# Patient Record
Sex: MALE | Race: WHITE | NOT HISPANIC OR LATINO | Employment: OTHER | URBAN - METROPOLITAN AREA
[De-identification: names, ages, dates, MRNs, and addresses within clinical notes are randomized per-mention and may not be internally consistent; named-entity substitution may affect disease eponyms.]

---

## 2023-01-01 ENCOUNTER — ANESTHESIA EVENT (INPATIENT)
Dept: PERIOP | Facility: HOSPITAL | Age: 58
DRG: 710 | End: 2023-01-01
Payer: COMMERCIAL

## 2023-01-01 ENCOUNTER — ANESTHESIA (INPATIENT)
Dept: PERIOP | Facility: HOSPITAL | Age: 58
DRG: 710 | End: 2023-01-01
Payer: COMMERCIAL

## 2023-01-01 ENCOUNTER — HOSPITAL ENCOUNTER (INPATIENT)
Facility: HOSPITAL | Age: 58
LOS: 1 days | DRG: 710 | End: 2023-09-14
Attending: EMERGENCY MEDICINE | Admitting: SURGERY
Payer: COMMERCIAL

## 2023-01-01 ENCOUNTER — APPOINTMENT (INPATIENT)
Dept: RADIOLOGY | Facility: HOSPITAL | Age: 58
DRG: 710 | End: 2023-01-01
Payer: COMMERCIAL

## 2023-01-01 ENCOUNTER — APPOINTMENT (EMERGENCY)
Dept: CT IMAGING | Facility: HOSPITAL | Age: 58
DRG: 710 | End: 2023-01-01
Payer: COMMERCIAL

## 2023-01-01 ENCOUNTER — APPOINTMENT (EMERGENCY)
Dept: RADIOLOGY | Facility: HOSPITAL | Age: 58
DRG: 710 | End: 2023-01-01
Payer: COMMERCIAL

## 2023-01-01 ENCOUNTER — HOSPITAL ENCOUNTER (INPATIENT)
Facility: HOSPITAL | Age: 58
LOS: 3 days | DRG: 710 | End: 2023-09-17
Attending: SURGERY | Admitting: SURGERY
Payer: COMMERCIAL

## 2023-01-01 VITALS
HEART RATE: 99 BPM | SYSTOLIC BLOOD PRESSURE: 92 MMHG | DIASTOLIC BLOOD PRESSURE: 55 MMHG | WEIGHT: 163.14 LBS | RESPIRATION RATE: 20 BRPM | OXYGEN SATURATION: 97 % | TEMPERATURE: 97.2 F

## 2023-01-01 VITALS
BODY MASS INDEX: 25.25 KG/M2 | HEIGHT: 70 IN | OXYGEN SATURATION: 100 % | WEIGHT: 176.37 LBS | HEART RATE: 80 BPM | RESPIRATION RATE: 24 BRPM | SYSTOLIC BLOOD PRESSURE: 83 MMHG | TEMPERATURE: 99.3 F | DIASTOLIC BLOOD PRESSURE: 52 MMHG

## 2023-01-01 DIAGNOSIS — A41.9 SEPSIS (HCC): ICD-10-CM

## 2023-01-01 DIAGNOSIS — E87.20 LACTIC ACIDOSIS: ICD-10-CM

## 2023-01-01 DIAGNOSIS — N17.9 AKI (ACUTE KIDNEY INJURY) (HCC): ICD-10-CM

## 2023-01-01 DIAGNOSIS — Z90.49 S/P PARTIAL RESECTION OF COLON: ICD-10-CM

## 2023-01-01 DIAGNOSIS — K63.1 PERFORATED SIGMOID COLON (HCC): ICD-10-CM

## 2023-01-01 DIAGNOSIS — K57.20 DIVERTICULITIS OF LARGE INTESTINE WITH PERFORATION AND ABSCESS WITHOUT BLEEDING: Primary | ICD-10-CM

## 2023-01-01 DIAGNOSIS — Z90.49 S/P SMALL BOWEL RESECTION: ICD-10-CM

## 2023-01-01 DIAGNOSIS — A41.9 SEPTIC SHOCK (HCC): ICD-10-CM

## 2023-01-01 DIAGNOSIS — D69.6 THROMBOCYTOPENIA (HCC): ICD-10-CM

## 2023-01-01 DIAGNOSIS — F10.90 ALCOHOL USE DISORDER: ICD-10-CM

## 2023-01-01 DIAGNOSIS — E87.6 LOW BLOOD POTASSIUM: ICD-10-CM

## 2023-01-01 DIAGNOSIS — M79.89 NECROTIZING SOFT TISSUE INFECTION: Primary | ICD-10-CM

## 2023-01-01 DIAGNOSIS — R65.21 SEPTIC SHOCK (HCC): ICD-10-CM

## 2023-01-01 DIAGNOSIS — D64.9 ANEMIA: ICD-10-CM

## 2023-01-01 DIAGNOSIS — E87.1 HYPONATREMIA: ICD-10-CM

## 2023-01-01 DIAGNOSIS — J18.9 PNEUMONIA: ICD-10-CM

## 2023-01-01 DIAGNOSIS — R79.0 LOW MAGNESIUM LEVEL: ICD-10-CM

## 2023-01-01 DIAGNOSIS — R19.8 PERFORATED ABDOMINAL VISCUS: ICD-10-CM

## 2023-01-01 LAB
2HR DELTA HS TROPONIN: -2 NG/L
ABO GROUP BLD BPU: NORMAL
ABO GROUP BLD: NORMAL
ALBUMIN SERPL BCP-MCNC: 1.7 G/DL (ref 3.5–5)
ALBUMIN SERPL BCP-MCNC: 1.7 G/DL (ref 3.5–5)
ALBUMIN SERPL BCP-MCNC: 2 G/DL (ref 3.5–5)
ALBUMIN SERPL BCP-MCNC: 2.1 G/DL (ref 3.5–5)
ALBUMIN SERPL BCP-MCNC: 2.1 G/DL (ref 3.5–5)
ALL TARGETS: NOT DETECTED
ALP SERPL-CCNC: 117 U/L (ref 34–104)
ALP SERPL-CCNC: 179 U/L (ref 34–104)
ALP SERPL-CCNC: 68 U/L (ref 34–104)
ALP SERPL-CCNC: 75 U/L (ref 34–104)
ALP SERPL-CCNC: 82 U/L (ref 34–104)
ALP SERPL-CCNC: 82 U/L (ref 34–104)
ALP SERPL-CCNC: 98 U/L (ref 34–104)
ALT SERPL W P-5'-P-CCNC: 14 U/L (ref 7–52)
ALT SERPL W P-5'-P-CCNC: 16 U/L (ref 7–52)
ALT SERPL W P-5'-P-CCNC: 18 U/L (ref 7–52)
ALT SERPL W P-5'-P-CCNC: 21 U/L (ref 7–52)
ALT SERPL W P-5'-P-CCNC: 47 U/L (ref 7–52)
ALT SERPL W P-5'-P-CCNC: 47 U/L (ref 7–52)
ALT SERPL W P-5'-P-CCNC: 61 U/L (ref 7–52)
AMMONIA PLAS-SCNC: 62 UMOL/L (ref 18–72)
ANION GAP SERPL CALCULATED.3IONS-SCNC: 15 MMOL/L
ANION GAP SERPL CALCULATED.3IONS-SCNC: 17 MMOL/L
ANION GAP SERPL CALCULATED.3IONS-SCNC: 17 MMOL/L
ANION GAP SERPL CALCULATED.3IONS-SCNC: 18 MMOL/L
ANION GAP SERPL CALCULATED.3IONS-SCNC: 19 MMOL/L
ANION GAP SERPL CALCULATED.3IONS-SCNC: 19 MMOL/L
ANION GAP SERPL CALCULATED.3IONS-SCNC: 20 MMOL/L
ANION GAP SERPL CALCULATED.3IONS-SCNC: 24 MMOL/L
ANION GAP SERPL CALCULATED.3IONS-SCNC: 25 MMOL/L
APAP SERPL-MCNC: <10 UG/ML (ref 10–20)
APTT PPP: 35 SECONDS (ref 23–37)
ARTERIAL PATENCY WRIST A: YES
AST SERPL W P-5'-P-CCNC: 196 U/L (ref 13–39)
AST SERPL W P-5'-P-CCNC: 196 U/L (ref 13–39)
AST SERPL W P-5'-P-CCNC: 198 U/L (ref 13–39)
AST SERPL W P-5'-P-CCNC: 42 U/L (ref 13–39)
AST SERPL W P-5'-P-CCNC: 43 U/L (ref 13–39)
AST SERPL W P-5'-P-CCNC: 57 U/L (ref 13–39)
AST SERPL W P-5'-P-CCNC: 75 U/L (ref 13–39)
ATRIAL RATE: 100 BPM
ATRIAL RATE: 109 BPM
ATRIAL RATE: 111 BPM
BACTERIA BLD CULT: ABNORMAL
BACTERIA SPEC ANAEROBE CULT: ABNORMAL
BACTERIA SPEC BFLD CULT: ABNORMAL
BASE EX.OXY STD BLDV CALC-SCNC: 70.8 % (ref 60–80)
BASE EXCESS BLDA CALC-SCNC: -10 MMOL/L (ref -2–3)
BASE EXCESS BLDA CALC-SCNC: -11.7 MMOL/L
BASE EXCESS BLDA CALC-SCNC: -11.9 MMOL/L
BASE EXCESS BLDA CALC-SCNC: -13 MMOL/L (ref -2–3)
BASE EXCESS BLDA CALC-SCNC: -18.4 MMOL/L
BASE EXCESS BLDA CALC-SCNC: -5.1 MMOL/L
BASE EXCESS BLDA CALC-SCNC: -5.3 MMOL/L
BASE EXCESS BLDA CALC-SCNC: -5.4 MMOL/L
BASE EXCESS BLDA CALC-SCNC: -6 MMOL/L (ref -2–3)
BASE EXCESS BLDA CALC-SCNC: -6.1 MMOL/L
BASE EXCESS BLDA CALC-SCNC: -6.4 MMOL/L
BASE EXCESS BLDA CALC-SCNC: -7 MMOL/L (ref -2–3)
BASE EXCESS BLDA CALC-SCNC: -7 MMOL/L (ref -2–3)
BASE EXCESS BLDA CALC-SCNC: -7.6 MMOL/L
BASE EXCESS BLDA CALC-SCNC: -8 MMOL/L (ref -2–3)
BASE EXCESS BLDA CALC-SCNC: -8.2 MMOL/L
BASE EXCESS BLDA CALC-SCNC: -8.7 MMOL/L
BASE EXCESS BLDV CALC-SCNC: -1.5 MMOL/L
BASOPHILS # BLD MANUAL: 0 THOUSAND/UL (ref 0–0.1)
BASOPHILS NFR MAR MANUAL: 0 % (ref 0–1)
BETA-HYDROXYBUTYRATE: 0.8 MMOL/L
BILIRUB DIRECT SERPL-MCNC: 1.37 MG/DL (ref 0–0.2)
BILIRUB DIRECT SERPL-MCNC: 1.93 MG/DL (ref 0–0.2)
BILIRUB DIRECT SERPL-MCNC: 4.07 MG/DL (ref 0–0.2)
BILIRUB DIRECT SERPL-MCNC: 4.1 MG/DL (ref 0–0.2)
BILIRUB SERPL-MCNC: 1.97 MG/DL (ref 0.2–1)
BILIRUB SERPL-MCNC: 3.3 MG/DL (ref 0.2–1)
BILIRUB SERPL-MCNC: 4.24 MG/DL (ref 0.2–1)
BILIRUB SERPL-MCNC: 4.29 MG/DL (ref 0.2–1)
BILIRUB SERPL-MCNC: 5.6 MG/DL (ref 0.2–1)
BILIRUB SERPL-MCNC: 6.03 MG/DL (ref 0.2–1)
BILIRUB SERPL-MCNC: 6.03 MG/DL (ref 0.2–1)
BLD GP AB SCN SERPL QL: NEGATIVE
BLD GP AB SCN SERPL QL: NEGATIVE
BODY TEMPERATURE: 100.6 DEGREES FEHRENHEIT
BODY TEMPERATURE: 100.8 DEGREES FEHRENHEIT
BODY TEMPERATURE: 100.9 DEGREES FEHRENHEIT
BODY TEMPERATURE: 98.6 DEGREES FEHRENHEIT
BODY TEMPERATURE: 99.1 DEGREES FEHRENHEIT
BODY TEMPERATURE: 99.3 DEGREES FEHRENHEIT
BPU ID: NORMAL
BUN SERPL-MCNC: 48 MG/DL (ref 5–25)
BUN SERPL-MCNC: 49 MG/DL (ref 5–25)
BUN SERPL-MCNC: 51 MG/DL (ref 5–25)
BUN SERPL-MCNC: 51 MG/DL (ref 5–25)
BUN SERPL-MCNC: 52 MG/DL (ref 5–25)
BUN SERPL-MCNC: 53 MG/DL (ref 5–25)
BUN SERPL-MCNC: 54 MG/DL (ref 5–25)
BUN SERPL-MCNC: 56 MG/DL (ref 5–25)
BUN SERPL-MCNC: 58 MG/DL (ref 5–25)
BUN SERPL-MCNC: 60 MG/DL (ref 5–25)
BUN SERPL-MCNC: 60 MG/DL (ref 5–25)
BUN SERPL-MCNC: 62 MG/DL (ref 5–25)
BUN SERPL-MCNC: 62 MG/DL (ref 5–25)
BUN SERPL-MCNC: 64 MG/DL (ref 5–25)
CA-I BLD-SCNC: 0.8 MMOL/L (ref 1.12–1.32)
CA-I BLD-SCNC: 0.85 MMOL/L (ref 1.12–1.32)
CA-I BLD-SCNC: 0.86 MMOL/L (ref 1.12–1.32)
CA-I BLD-SCNC: 0.86 MMOL/L (ref 1.12–1.32)
CA-I BLD-SCNC: 0.87 MMOL/L (ref 1.12–1.32)
CA-I BLD-SCNC: 0.89 MMOL/L (ref 1.12–1.32)
CA-I BLD-SCNC: 0.91 MMOL/L (ref 1.12–1.32)
CA-I BLD-SCNC: 0.91 MMOL/L (ref 1.12–1.32)
CA-I BLD-SCNC: 0.92 MMOL/L (ref 1.12–1.32)
CA-I BLD-SCNC: 0.92 MMOL/L (ref 1.12–1.32)
CA-I BLD-SCNC: 0.94 MMOL/L (ref 1.12–1.32)
CA-I BLD-SCNC: 0.96 MMOL/L (ref 1.12–1.32)
CA-I BLD-SCNC: 0.97 MMOL/L (ref 1.12–1.32)
CA-I BLD-SCNC: 0.97 MMOL/L (ref 1.12–1.32)
CA-I BLD-SCNC: 1.01 MMOL/L (ref 1.12–1.32)
CA-I BLD-SCNC: 1.01 MMOL/L (ref 1.12–1.32)
CA-I BLD-SCNC: 1.04 MMOL/L (ref 1.12–1.32)
CA-I BLD-SCNC: 1.1 MMOL/L (ref 1.12–1.32)
CALCIUM ALBUM COR SERPL-MCNC: 8.4 MG/DL (ref 8.3–10.1)
CALCIUM ALBUM COR SERPL-MCNC: 9.3 MG/DL (ref 8.3–10.1)
CALCIUM ALBUM COR SERPL-MCNC: 9.7 MG/DL (ref 8.3–10.1)
CALCIUM SERPL-MCNC: 6.5 MG/DL (ref 8.4–10.2)
CALCIUM SERPL-MCNC: 6.7 MG/DL (ref 8.4–10.2)
CALCIUM SERPL-MCNC: 6.7 MG/DL (ref 8.4–10.2)
CALCIUM SERPL-MCNC: 6.8 MG/DL (ref 8.4–10.2)
CALCIUM SERPL-MCNC: 6.9 MG/DL (ref 8.4–10.2)
CALCIUM SERPL-MCNC: 7 MG/DL (ref 8.4–10.2)
CALCIUM SERPL-MCNC: 7.1 MG/DL (ref 8.4–10.2)
CALCIUM SERPL-MCNC: 7.1 MG/DL (ref 8.4–10.2)
CALCIUM SERPL-MCNC: 7.2 MG/DL (ref 8.4–10.2)
CALCIUM SERPL-MCNC: 7.3 MG/DL (ref 8.4–10.2)
CALCIUM SERPL-MCNC: 7.3 MG/DL (ref 8.4–10.2)
CALCIUM SERPL-MCNC: 7.4 MG/DL (ref 8.4–10.2)
CALCIUM SERPL-MCNC: 7.5 MG/DL (ref 8.4–10.2)
CALCIUM SERPL-MCNC: 8.1 MG/DL (ref 8.4–10.2)
CARDIAC TROPONIN I PNL SERPL HS: 15 NG/L
CARDIAC TROPONIN I PNL SERPL HS: 17 NG/L
CFFMA (FUNCTIONAL FIBRINOGEN MAX AMPLITUDE): 16.1 MM (ref 15–32)
CFFMA (FUNCTIONAL FIBRINOGEN MAX AMPLITUDE): 19.5 MM (ref 15–32)
CHLORIDE SERPL-SCNC: 100 MMOL/L (ref 96–108)
CHLORIDE SERPL-SCNC: 101 MMOL/L (ref 96–108)
CHLORIDE SERPL-SCNC: 81 MMOL/L (ref 96–108)
CHLORIDE SERPL-SCNC: 91 MMOL/L (ref 96–108)
CHLORIDE SERPL-SCNC: 95 MMOL/L (ref 96–108)
CHLORIDE SERPL-SCNC: 96 MMOL/L (ref 96–108)
CHLORIDE SERPL-SCNC: 97 MMOL/L (ref 96–108)
CHLORIDE SERPL-SCNC: 99 MMOL/L (ref 96–108)
CHLORIDE SERPL-SCNC: 99 MMOL/L (ref 96–108)
CK SERPL-CCNC: 233 U/L (ref 39–308)
CKLY30: 0 % (ref 0–2.6)
CKLY30: 0 % (ref 0–2.6)
CKR(REACTION TIME): 6.4 MIN (ref 4.6–9.1)
CKR(REACTION TIME): 8.9 MIN (ref 4.6–9.1)
CO2 SERPL-SCNC: 10 MMOL/L (ref 21–32)
CO2 SERPL-SCNC: 16 MMOL/L (ref 21–32)
CO2 SERPL-SCNC: 16 MMOL/L (ref 21–32)
CO2 SERPL-SCNC: 17 MMOL/L (ref 21–32)
CO2 SERPL-SCNC: 18 MMOL/L (ref 21–32)
CO2 SERPL-SCNC: 19 MMOL/L (ref 21–32)
CO2 SERPL-SCNC: 20 MMOL/L (ref 21–32)
CO2 SERPL-SCNC: 21 MMOL/L (ref 21–32)
CREAT SERPL-MCNC: 2.07 MG/DL (ref 0.6–1.3)
CREAT SERPL-MCNC: 2.09 MG/DL (ref 0.6–1.3)
CREAT SERPL-MCNC: 2.11 MG/DL (ref 0.6–1.3)
CREAT SERPL-MCNC: 2.27 MG/DL (ref 0.6–1.3)
CREAT SERPL-MCNC: 2.34 MG/DL (ref 0.6–1.3)
CREAT SERPL-MCNC: 2.42 MG/DL (ref 0.6–1.3)
CREAT SERPL-MCNC: 2.44 MG/DL (ref 0.6–1.3)
CREAT SERPL-MCNC: 2.45 MG/DL (ref 0.6–1.3)
CREAT SERPL-MCNC: 2.46 MG/DL (ref 0.6–1.3)
CREAT SERPL-MCNC: 2.46 MG/DL (ref 0.6–1.3)
CREAT SERPL-MCNC: 2.51 MG/DL (ref 0.6–1.3)
CREAT SERPL-MCNC: 2.54 MG/DL (ref 0.6–1.3)
CREAT SERPL-MCNC: 2.65 MG/DL (ref 0.6–1.3)
CREAT SERPL-MCNC: 3.09 MG/DL (ref 0.6–1.3)
CROSSMATCH: NORMAL
CRTMA(RAPIDTEG MAX AMPLITUDE): 47 MM (ref 52–70)
CRTMA(RAPIDTEG MAX AMPLITUDE): 50.6 MM (ref 52–70)
E COLI DNA BLD POS QL NAA+NON-PROBE: DETECTED
EOSINOPHIL # BLD MANUAL: 0 THOUSAND/UL (ref 0–0.4)
EOSINOPHIL NFR BLD MANUAL: 0 % (ref 0–6)
ERYTHROCYTE [DISTWIDTH] IN BLOOD BY AUTOMATED COUNT: 14.4 % (ref 11.6–15.1)
ERYTHROCYTE [DISTWIDTH] IN BLOOD BY AUTOMATED COUNT: 16.2 % (ref 11.6–15.1)
ERYTHROCYTE [DISTWIDTH] IN BLOOD BY AUTOMATED COUNT: 16.5 % (ref 11.6–15.1)
ERYTHROCYTE [DISTWIDTH] IN BLOOD BY AUTOMATED COUNT: 17.4 % (ref 11.6–15.1)
ERYTHROCYTE [DISTWIDTH] IN BLOOD BY AUTOMATED COUNT: 18.5 % (ref 11.6–15.1)
ERYTHROCYTE [DISTWIDTH] IN BLOOD BY AUTOMATED COUNT: 18.8 % (ref 11.6–15.1)
ERYTHROCYTE [DISTWIDTH] IN BLOOD BY AUTOMATED COUNT: 20.7 % (ref 11.6–15.1)
ERYTHROCYTE [DISTWIDTH] IN BLOOD BY AUTOMATED COUNT: 21.3 % (ref 11.6–15.1)
ERYTHROCYTE [DISTWIDTH] IN BLOOD BY AUTOMATED COUNT: 21.3 % (ref 11.6–15.1)
ERYTHROCYTE [DISTWIDTH] IN BLOOD BY AUTOMATED COUNT: 21.8 % (ref 11.6–15.1)
ERYTHROCYTE [DISTWIDTH] IN BLOOD BY AUTOMATED COUNT: 22.1 % (ref 11.6–15.1)
ETHANOL SERPL-MCNC: <10 MG/DL
FLUAV RNA RESP QL NAA+PROBE: NEGATIVE
FLUBV RNA RESP QL NAA+PROBE: NEGATIVE
GFR SERPL CREATININE-BSD FRML MDRD: 20 ML/MIN/1.73SQ M
GFR SERPL CREATININE-BSD FRML MDRD: 25 ML/MIN/1.73SQ M
GFR SERPL CREATININE-BSD FRML MDRD: 26 ML/MIN/1.73SQ M
GFR SERPL CREATININE-BSD FRML MDRD: 26 ML/MIN/1.73SQ M
GFR SERPL CREATININE-BSD FRML MDRD: 27 ML/MIN/1.73SQ M
GFR SERPL CREATININE-BSD FRML MDRD: 28 ML/MIN/1.73SQ M
GFR SERPL CREATININE-BSD FRML MDRD: 29 ML/MIN/1.73SQ M
GFR SERPL CREATININE-BSD FRML MDRD: 30 ML/MIN/1.73SQ M
GFR SERPL CREATININE-BSD FRML MDRD: 33 ML/MIN/1.73SQ M
GFR SERPL CREATININE-BSD FRML MDRD: 33 ML/MIN/1.73SQ M
GFR SERPL CREATININE-BSD FRML MDRD: 34 ML/MIN/1.73SQ M
GLUCOSE SERPL-MCNC: 100 MG/DL (ref 65–140)
GLUCOSE SERPL-MCNC: 101 MG/DL (ref 65–140)
GLUCOSE SERPL-MCNC: 103 MG/DL (ref 65–140)
GLUCOSE SERPL-MCNC: 104 MG/DL (ref 65–140)
GLUCOSE SERPL-MCNC: 110 MG/DL (ref 65–140)
GLUCOSE SERPL-MCNC: 111 MG/DL (ref 65–140)
GLUCOSE SERPL-MCNC: 114 MG/DL (ref 65–140)
GLUCOSE SERPL-MCNC: 116 MG/DL (ref 65–140)
GLUCOSE SERPL-MCNC: 117 MG/DL (ref 65–140)
GLUCOSE SERPL-MCNC: 125 MG/DL (ref 65–140)
GLUCOSE SERPL-MCNC: 128 MG/DL (ref 65–140)
GLUCOSE SERPL-MCNC: 132 MG/DL (ref 65–140)
GLUCOSE SERPL-MCNC: 133 MG/DL (ref 65–140)
GLUCOSE SERPL-MCNC: 166 MG/DL (ref 65–140)
GLUCOSE SERPL-MCNC: 40 MG/DL (ref 65–140)
GLUCOSE SERPL-MCNC: 49 MG/DL (ref 65–140)
GLUCOSE SERPL-MCNC: 53 MG/DL (ref 65–140)
GLUCOSE SERPL-MCNC: 79 MG/DL (ref 65–140)
GLUCOSE SERPL-MCNC: 89 MG/DL (ref 65–140)
GLUCOSE SERPL-MCNC: 89 MG/DL (ref 65–140)
GLUCOSE SERPL-MCNC: 90 MG/DL (ref 65–140)
GLUCOSE SERPL-MCNC: 96 MG/DL (ref 65–140)
GRAM STN SPEC: ABNORMAL
HCO3 BLDA-SCNC: 15.5 MMOL/L (ref 22–28)
HCO3 BLDA-SCNC: 15.5 MMOL/L (ref 22–28)
HCO3 BLDA-SCNC: 15.8 MMOL/L (ref 22–28)
HCO3 BLDA-SCNC: 16.6 MMOL/L (ref 22–28)
HCO3 BLDA-SCNC: 17.6 MMOL/L (ref 22–28)
HCO3 BLDA-SCNC: 17.9 MMOL/L (ref 22–28)
HCO3 BLDA-SCNC: 18 MMOL/L (ref 22–28)
HCO3 BLDA-SCNC: 18.5 MMOL/L (ref 22–28)
HCO3 BLDA-SCNC: 18.6 MMOL/L (ref 22–28)
HCO3 BLDA-SCNC: 19.2 MMOL/L (ref 22–28)
HCO3 BLDA-SCNC: 19.3 MMOL/L (ref 22–28)
HCO3 BLDA-SCNC: 19.5 MMOL/L (ref 22–28)
HCO3 BLDA-SCNC: 20.1 MMOL/L (ref 22–28)
HCO3 BLDA-SCNC: 20.1 MMOL/L (ref 22–28)
HCO3 BLDA-SCNC: 20.2 MMOL/L (ref 24–30)
HCO3 BLDA-SCNC: 21.3 MMOL/L (ref 24–30)
HCO3 BLDA-SCNC: 9.3 MMOL/L (ref 22–28)
HCO3 BLDV-SCNC: 22.6 MMOL/L (ref 24–30)
HCT VFR BLD AUTO: 20.6 % (ref 36.5–49.3)
HCT VFR BLD AUTO: 20.7 % (ref 36.5–49.3)
HCT VFR BLD AUTO: 20.8 % (ref 36.5–49.3)
HCT VFR BLD AUTO: 21.5 % (ref 36.5–49.3)
HCT VFR BLD AUTO: 23.8 % (ref 36.5–49.3)
HCT VFR BLD AUTO: 23.8 % (ref 36.5–49.3)
HCT VFR BLD AUTO: 24.2 % (ref 36.5–49.3)
HCT VFR BLD AUTO: 24.5 % (ref 36.5–49.3)
HCT VFR BLD AUTO: 24.7 % (ref 36.5–49.3)
HCT VFR BLD AUTO: 28.8 % (ref 36.5–49.3)
HCT VFR BLD AUTO: 29.7 % (ref 36.5–49.3)
HCT VFR BLD AUTO: 30.7 % (ref 36.5–49.3)
HCT VFR BLD CALC: 15 % (ref 36.5–49.3)
HCT VFR BLD CALC: 19 % (ref 36.5–49.3)
HCT VFR BLD CALC: 19 % (ref 36.5–49.3)
HCT VFR BLD CALC: 21 % (ref 36.5–49.3)
HCT VFR BLD CALC: 21 % (ref 36.5–49.3)
HCT VFR BLD CALC: 22 % (ref 36.5–49.3)
HCT VFR BLD CALC: 24 % (ref 36.5–49.3)
HCT VFR BLD CALC: 28 % (ref 36.5–49.3)
HGB BLD-MCNC: 10.4 G/DL (ref 12–17)
HGB BLD-MCNC: 11.4 G/DL (ref 12–17)
HGB BLD-MCNC: 7.1 G/DL (ref 12–17)
HGB BLD-MCNC: 7.1 G/DL (ref 12–17)
HGB BLD-MCNC: 7.2 G/DL (ref 12–17)
HGB BLD-MCNC: 7.3 G/DL (ref 12–17)
HGB BLD-MCNC: 8.4 G/DL (ref 12–17)
HGB BLD-MCNC: 8.7 G/DL (ref 12–17)
HGB BLD-MCNC: 8.9 G/DL (ref 12–17)
HGB BLD-MCNC: 9.6 G/DL (ref 12–17)
HGB BLDA-MCNC: 5.1 G/DL (ref 12–17)
HGB BLDA-MCNC: 6.5 G/DL (ref 12–17)
HGB BLDA-MCNC: 6.5 G/DL (ref 12–17)
HGB BLDA-MCNC: 7.1 G/DL (ref 12–17)
HGB BLDA-MCNC: 7.1 G/DL (ref 12–17)
HGB BLDA-MCNC: 7.5 G/DL (ref 12–17)
HGB BLDA-MCNC: 8.2 G/DL (ref 12–17)
HGB BLDA-MCNC: 9.5 G/DL (ref 12–17)
HOROWITZ INDEX BLDA+IHG-RTO: 50 MM[HG]
HOROWITZ INDEX BLDA+IHG-RTO: 60 MM[HG]
HOROWITZ INDEX BLDA+IHG-RTO: 70 MM[HG]
HYPERCHROMIA BLD QL SMEAR: PRESENT
INR PPP: 1.41 (ref 0.84–1.19)
INR PPP: 1.81 (ref 0.84–1.19)
LACTATE SERPL-SCNC: 1.6 MMOL/L (ref 0.5–2)
LACTATE SERPL-SCNC: 1.8 MMOL/L (ref 0.5–2)
LACTATE SERPL-SCNC: 10.4 MMOL/L (ref 0.5–2)
LACTATE SERPL-SCNC: 11.5 MMOL/L (ref 0.5–2)
LACTATE SERPL-SCNC: 2.1 MMOL/L (ref 0.5–2)
LACTATE SERPL-SCNC: 2.1 MMOL/L (ref 0.5–2)
LACTATE SERPL-SCNC: 2.6 MMOL/L (ref 0.5–2)
LACTATE SERPL-SCNC: 3.2 MMOL/L (ref 0.5–2)
LACTATE SERPL-SCNC: 3.2 MMOL/L (ref 0.5–2)
LACTATE SERPL-SCNC: 3.5 MMOL/L (ref 0.5–2)
LACTATE SERPL-SCNC: 4.1 MMOL/L (ref 0.5–2)
LACTATE SERPL-SCNC: 4.3 MMOL/L (ref 0.5–2)
LACTATE SERPL-SCNC: 5.1 MMOL/L (ref 0.5–2)
LACTATE SERPL-SCNC: 5.4 MMOL/L (ref 0.5–2)
LACTATE SERPL-SCNC: 5.6 MMOL/L (ref 0.5–2)
LACTATE SERPL-SCNC: 6.3 MMOL/L (ref 0.5–2)
LACTATE SERPL-SCNC: 7.6 MMOL/L (ref 0.5–2)
LACTATE SERPL-SCNC: 8.7 MMOL/L (ref 0.5–2)
LG PLATELETS BLD QL SMEAR: PRESENT
LIPASE SERPL-CCNC: <6 U/L (ref 11–82)
LYMPHOCYTES # BLD AUTO: 0.91 THOUSAND/UL (ref 0.6–4.47)
LYMPHOCYTES # BLD AUTO: 7 % (ref 14–44)
MAGNESIUM SERPL-MCNC: 1.7 MG/DL (ref 1.9–2.7)
MAGNESIUM SERPL-MCNC: 2.1 MG/DL (ref 1.9–2.7)
MAGNESIUM SERPL-MCNC: 2.2 MG/DL (ref 1.9–2.7)
MAGNESIUM SERPL-MCNC: 2.2 MG/DL (ref 1.9–2.7)
MAGNESIUM SERPL-MCNC: 2.3 MG/DL (ref 1.9–2.7)
MAGNESIUM SERPL-MCNC: 2.4 MG/DL (ref 1.9–2.7)
MAGNESIUM SERPL-MCNC: 2.5 MG/DL (ref 1.9–2.7)
MCH RBC QN AUTO: 31.7 PG (ref 26.8–34.3)
MCH RBC QN AUTO: 31.8 PG (ref 26.8–34.3)
MCH RBC QN AUTO: 32 PG (ref 26.8–34.3)
MCH RBC QN AUTO: 32 PG (ref 26.8–34.3)
MCH RBC QN AUTO: 32.4 PG (ref 26.8–34.3)
MCH RBC QN AUTO: 32.7 PG (ref 26.8–34.3)
MCH RBC QN AUTO: 36.6 PG (ref 26.8–34.3)
MCH RBC QN AUTO: 36.7 PG (ref 26.8–34.3)
MCH RBC QN AUTO: 37 PG (ref 26.8–34.3)
MCH RBC QN AUTO: 38.5 PG (ref 26.8–34.3)
MCH RBC QN AUTO: 40.2 PG (ref 26.8–34.3)
MCHC RBC AUTO-ENTMCNC: 33.3 G/DL (ref 31.4–37.4)
MCHC RBC AUTO-ENTMCNC: 34 G/DL (ref 31.4–37.4)
MCHC RBC AUTO-ENTMCNC: 34.5 G/DL (ref 31.4–37.4)
MCHC RBC AUTO-ENTMCNC: 34.6 G/DL (ref 31.4–37.4)
MCHC RBC AUTO-ENTMCNC: 34.7 G/DL (ref 31.4–37.4)
MCHC RBC AUTO-ENTMCNC: 35 G/DL (ref 31.4–37.4)
MCHC RBC AUTO-ENTMCNC: 35.3 G/DL (ref 31.4–37.4)
MCHC RBC AUTO-ENTMCNC: 35.3 G/DL (ref 31.4–37.4)
MCHC RBC AUTO-ENTMCNC: 35.5 G/DL (ref 31.4–37.4)
MCHC RBC AUTO-ENTMCNC: 36 G/DL (ref 31.4–37.4)
MCHC RBC AUTO-ENTMCNC: 37.1 G/DL (ref 31.4–37.4)
MCV RBC AUTO: 103 FL (ref 82–98)
MCV RBC AUTO: 104 FL (ref 82–98)
MCV RBC AUTO: 106 FL (ref 82–98)
MCV RBC AUTO: 107 FL (ref 82–98)
MCV RBC AUTO: 114 FL (ref 82–98)
MCV RBC AUTO: 90 FL (ref 82–98)
MCV RBC AUTO: 90 FL (ref 82–98)
MCV RBC AUTO: 92 FL (ref 82–98)
MCV RBC AUTO: 93 FL (ref 82–98)
MCV RBC AUTO: 94 FL (ref 82–98)
MCV RBC AUTO: 95 FL (ref 82–98)
MONOCYTES # BLD AUTO: 0.65 THOUSAND/UL (ref 0–1.22)
MONOCYTES NFR BLD: 5 % (ref 4–12)
NEUTROPHILS # BLD MANUAL: 11.44 THOUSAND/UL (ref 1.85–7.62)
NEUTS BAND NFR BLD MANUAL: 10 % (ref 0–8)
NEUTS SEG NFR BLD AUTO: 78 % (ref 43–75)
NRBC BLD AUTO-RTO: 0 /100 WBCS
O2 CT BLDA-SCNC: 10.2 ML/DL (ref 16–23)
O2 CT BLDA-SCNC: 10.8 ML/DL (ref 16–23)
O2 CT BLDA-SCNC: 11 ML/DL (ref 16–23)
O2 CT BLDA-SCNC: 12.2 ML/DL (ref 16–23)
O2 CT BLDA-SCNC: 12.7 ML/DL (ref 16–23)
O2 CT BLDA-SCNC: 12.7 ML/DL (ref 16–23)
O2 CT BLDA-SCNC: 12.9 ML/DL (ref 16–23)
O2 CT BLDA-SCNC: 13 ML/DL (ref 16–23)
O2 CT BLDA-SCNC: 14.6 ML/DL (ref 16–23)
O2 CT BLDA-SCNC: 14.9 ML/DL (ref 16–23)
O2 CT BLDA-SCNC: 15.2 ML/DL (ref 16–23)
O2 CT BLDV-SCNC: 12.3 ML/DL
OXYHGB MFR BLDA: 93.3 % (ref 94–97)
OXYHGB MFR BLDA: 93.4 % (ref 94–97)
OXYHGB MFR BLDA: 95.6 % (ref 94–97)
OXYHGB MFR BLDA: 96.2 % (ref 94–97)
OXYHGB MFR BLDA: 96.7 % (ref 94–97)
OXYHGB MFR BLDA: 96.8 % (ref 94–97)
OXYHGB MFR BLDA: 97 % (ref 94–97)
OXYHGB MFR BLDA: 97.4 % (ref 94–97)
P AXIS: 51 DEGREES
P AXIS: 63 DEGREES
P AXIS: 76 DEGREES
PCO2 BLD: 17 MMOL/L (ref 21–32)
PCO2 BLD: 19 MMOL/L (ref 21–32)
PCO2 BLD: 21 MMOL/L (ref 21–32)
PCO2 BLD: 22 MMOL/L (ref 21–32)
PCO2 BLD: 23 MMOL/L (ref 21–32)
PCO2 BLD: 41.7 MM HG (ref 36–44)
PCO2 BLD: 42.8 MM HG (ref 42–50)
PCO2 BLD: 42.9 MM HG (ref 36–44)
PCO2 BLD: 46.6 MM HG (ref 36–44)
PCO2 BLD: 46.8 MM HG (ref 36–44)
PCO2 BLD: 46.8 MM HG (ref 36–44)
PCO2 BLD: 48.7 MM HG (ref 42–50)
PCO2 BLD: 49.8 MM HG (ref 36–44)
PCO2 BLDA: 28.8 MM HG (ref 36–44)
PCO2 BLDA: 33.3 MM HG (ref 36–44)
PCO2 BLDA: 33.5 MM HG (ref 36–44)
PCO2 BLDA: 33.8 MM HG (ref 36–44)
PCO2 BLDA: 34.1 MM HG (ref 36–44)
PCO2 BLDA: 36.3 MM HG (ref 36–44)
PCO2 BLDA: 39.4 MM HG (ref 36–44)
PCO2 BLDA: 40.5 MM HG (ref 36–44)
PCO2 BLDA: 42 MM HG (ref 36–44)
PCO2 BLDV: 35.9 MM HG (ref 42–50)
PCO2 TEMP ADJ BLDA: 30.2 MM HG (ref 36–44)
PCO2 TEMP ADJ BLDA: 35.6 MM HG (ref 36–44)
PCO2 TEMP ADJ BLDA: 41.2 MM HG (ref 36–44)
PEEP RESPIRATORY: 6 CM[H2O]
PH BLD: 7.11 [PH] (ref 7.35–7.45)
PH BLD: 7.12 [PH] (ref 7.35–7.45)
PH BLD: 7.18 [PH] (ref 7.35–7.45)
PH BLD: 7.2 [PH] (ref 7.35–7.45)
PH BLD: 7.22 [PH] (ref 7.35–7.45)
PH BLD: 7.24 [PH] (ref 7.35–7.45)
PH BLD: 7.25 [PH] (ref 7.3–7.4)
PH BLD: 7.26 [PH] (ref 7.35–7.45)
PH BLD: 7.28 [PH] (ref 7.3–7.4)
PH BLD: 7.29 [PH] (ref 7.35–7.45)
PH BLD: 7.36 [PH] (ref 7.35–7.45)
PH BLDA: 7.13 [PH] (ref 7.35–7.45)
PH BLDA: 7.18 [PH] (ref 7.35–7.45)
PH BLDA: 7.2 [PH] (ref 7.35–7.45)
PH BLDA: 7.28 [PH] (ref 7.35–7.45)
PH BLDA: 7.31 [PH] (ref 7.35–7.45)
PH BLDA: 7.31 [PH] (ref 7.35–7.45)
PH BLDA: 7.35 [PH] (ref 7.35–7.45)
PH BLDA: 7.36 [PH] (ref 7.35–7.45)
PH BLDA: 7.37 [PH] (ref 7.35–7.45)
PH BLDA: 7.38 [PH] (ref 7.35–7.45)
PH BLDA: 7.38 [PH] (ref 7.35–7.45)
PH BLDV: 7.42 [PH] (ref 7.3–7.4)
PHOSPHATE SERPL-MCNC: 5.2 MG/DL (ref 2.7–4.5)
PHOSPHATE SERPL-MCNC: 5.5 MG/DL (ref 2.7–4.5)
PHOSPHATE SERPL-MCNC: 5.5 MG/DL (ref 2.7–4.5)
PHOSPHATE SERPL-MCNC: 6.1 MG/DL (ref 2.7–4.5)
PHOSPHATE SERPL-MCNC: 6.6 MG/DL (ref 2.7–4.5)
PHOSPHATE SERPL-MCNC: 7.3 MG/DL (ref 2.7–4.5)
PLATELET # BLD AUTO: 26 THOUSANDS/UL (ref 149–390)
PLATELET # BLD AUTO: 34 THOUSANDS/UL (ref 149–390)
PLATELET # BLD AUTO: 39 THOUSANDS/UL (ref 149–390)
PLATELET # BLD AUTO: 48 THOUSANDS/UL (ref 149–390)
PLATELET # BLD AUTO: 48 THOUSANDS/UL (ref 149–390)
PLATELET # BLD AUTO: 50 THOUSANDS/UL (ref 149–390)
PLATELET # BLD AUTO: 52 THOUSANDS/UL (ref 149–390)
PLATELET # BLD AUTO: 58 THOUSANDS/UL (ref 149–390)
PLATELET # BLD AUTO: 61 THOUSANDS/UL (ref 149–390)
PLATELET # BLD AUTO: 64 THOUSANDS/UL (ref 149–390)
PLATELET # BLD AUTO: 82 THOUSANDS/UL (ref 149–390)
PLATELET BLD QL SMEAR: ABNORMAL
PMV BLD AUTO: 10.2 FL (ref 8.9–12.7)
PMV BLD AUTO: 10.7 FL (ref 8.9–12.7)
PMV BLD AUTO: 10.7 FL (ref 8.9–12.7)
PMV BLD AUTO: 11.4 FL (ref 8.9–12.7)
PMV BLD AUTO: 11.7 FL (ref 8.9–12.7)
PMV BLD AUTO: 11.8 FL (ref 8.9–12.7)
PMV BLD AUTO: 12 FL (ref 8.9–12.7)
PMV BLD AUTO: 12.4 FL (ref 8.9–12.7)
PMV BLD AUTO: 13 FL (ref 8.9–12.7)
PMV BLD AUTO: 13.1 FL (ref 8.9–12.7)
PMV BLD AUTO: 13.6 FL (ref 8.9–12.7)
PO2 BLD: 126.1 MM HG (ref 75–129)
PO2 BLD: 139 MM HG (ref 75–129)
PO2 BLD: 145 MM HG (ref 35–45)
PO2 BLD: 149 MM HG (ref 35–45)
PO2 BLD: 158.7 MM HG (ref 75–129)
PO2 BLD: 179.2 MM HG (ref 75–129)
PO2 BLD: 384 MM HG (ref 75–129)
PO2 BLD: 386 MM HG (ref 75–129)
PO2 BLD: 71 MM HG (ref 75–129)
PO2 BLD: 98 MM HG (ref 75–129)
PO2 BLD: >400 MM HG (ref 75–129)
PO2 BLDA: 100.1 MM HG (ref 75–129)
PO2 BLDA: 102.6 MM HG (ref 75–129)
PO2 BLDA: 118.4 MM HG (ref 75–129)
PO2 BLDA: 119.1 MM HG (ref 75–129)
PO2 BLDA: 126.9 MM HG (ref 75–129)
PO2 BLDA: 127.3 MM HG (ref 75–129)
PO2 BLDA: 146.1 MM HG (ref 75–129)
PO2 BLDA: 156.3 MM HG (ref 75–129)
PO2 BLDA: 173 MM HG (ref 75–129)
PO2 BLDA: 77.7 MM HG (ref 75–129)
PO2 BLDA: 93.8 MM HG (ref 75–129)
PO2 BLDV: 39.6 MM HG (ref 35–45)
POTASSIUM BLD-SCNC: 2.5 MMOL/L (ref 3.5–5.3)
POTASSIUM BLD-SCNC: 3.1 MMOL/L (ref 3.5–5.3)
POTASSIUM BLD-SCNC: 3.5 MMOL/L (ref 3.5–5.3)
POTASSIUM BLD-SCNC: 3.8 MMOL/L (ref 3.5–5.3)
POTASSIUM BLD-SCNC: 3.9 MMOL/L (ref 3.5–5.3)
POTASSIUM BLD-SCNC: 4 MMOL/L (ref 3.5–5.3)
POTASSIUM BLD-SCNC: 4 MMOL/L (ref 3.5–5.3)
POTASSIUM BLD-SCNC: 4.3 MMOL/L (ref 3.5–5.3)
POTASSIUM SERPL-SCNC: 2.2 MMOL/L (ref 3.5–5.3)
POTASSIUM SERPL-SCNC: 3.3 MMOL/L (ref 3.5–5.3)
POTASSIUM SERPL-SCNC: 3.3 MMOL/L (ref 3.5–5.3)
POTASSIUM SERPL-SCNC: 3.9 MMOL/L (ref 3.5–5.3)
POTASSIUM SERPL-SCNC: 4 MMOL/L (ref 3.5–5.3)
POTASSIUM SERPL-SCNC: 4.1 MMOL/L (ref 3.5–5.3)
POTASSIUM SERPL-SCNC: 4.2 MMOL/L (ref 3.5–5.3)
POTASSIUM SERPL-SCNC: 4.2 MMOL/L (ref 3.5–5.3)
POTASSIUM SERPL-SCNC: 4.3 MMOL/L (ref 3.5–5.3)
POTASSIUM SERPL-SCNC: 4.5 MMOL/L (ref 3.5–5.3)
PR INTERVAL: 158 MS
PR INTERVAL: 158 MS
PR INTERVAL: 160 MS
PROCALCITONIN SERPL-MCNC: 24.18 NG/ML
PROT SERPL-MCNC: 3.2 G/DL (ref 6.4–8.4)
PROT SERPL-MCNC: 3.2 G/DL (ref 6.4–8.4)
PROT SERPL-MCNC: 3.4 G/DL (ref 6.4–8.4)
PROT SERPL-MCNC: 3.5 G/DL (ref 6.4–8.4)
PROT SERPL-MCNC: 4.6 G/DL (ref 6.4–8.4)
PROTHROMBIN TIME: 17.1 SECONDS (ref 11.6–14.5)
PROTHROMBIN TIME: 21.2 SECONDS (ref 11.6–14.5)
QRS AXIS: -31 DEGREES
QRS AXIS: -41 DEGREES
QRS AXIS: -49 DEGREES
QRSD INTERVAL: 92 MS
QRSD INTERVAL: 92 MS
QRSD INTERVAL: 96 MS
QT INTERVAL: 321 MS
QT INTERVAL: 350 MS
QT INTERVAL: 382 MS
QTC INTERVAL: 437 MS
QTC INTERVAL: 472 MS
QTC INTERVAL: 492 MS
RBC # BLD AUTO: 1.92 MILLION/UL (ref 3.88–5.62)
RBC # BLD AUTO: 2.09 MILLION/UL (ref 3.88–5.62)
RBC # BLD AUTO: 2.25 MILLION/UL (ref 3.88–5.62)
RBC # BLD AUTO: 2.28 MILLION/UL (ref 3.88–5.62)
RBC # BLD AUTO: 2.29 MILLION/UL (ref 3.88–5.62)
RBC # BLD AUTO: 2.38 MILLION/UL (ref 3.88–5.62)
RBC # BLD AUTO: 2.64 MILLION/UL (ref 3.88–5.62)
RBC # BLD AUTO: 2.75 MILLION/UL (ref 3.88–5.62)
RBC # BLD AUTO: 2.96 MILLION/UL (ref 3.88–5.62)
RBC # BLD AUTO: 3.03 MILLION/UL (ref 3.88–5.62)
RBC # BLD AUTO: 3.18 MILLION/UL (ref 3.88–5.62)
RBC MORPH BLD: PRESENT
RH BLD: NEGATIVE
RSV RNA RESP QL NAA+PROBE: NEGATIVE
SALICYLATES SERPL-MCNC: <5 MG/DL (ref 3–20)
SAO2 % BLD FROM PO2: 100 % (ref 60–85)
SAO2 % BLD FROM PO2: 100 % (ref 60–85)
SAO2 % BLD FROM PO2: 91 % (ref 60–85)
SAO2 % BLD FROM PO2: 97 % (ref 60–85)
SAO2 % BLD FROM PO2: 98 % (ref 60–85)
SAO2 % BLD FROM PO2: 99 % (ref 60–85)
SAO2 % BLD FROM PO2: 99 % (ref 60–85)
SARS-COV-2 RNA RESP QL NAA+PROBE: NEGATIVE
SIMV VENT INSPIRED AIR FIO2: 50
SIMV VENT PEEP: 6
SIMV VENT TIDAL VOLUME: 500
SIMV VENT: ABNORMAL
SODIUM BLD-SCNC: 126 MMOL/L (ref 136–145)
SODIUM BLD-SCNC: 126 MMOL/L (ref 136–145)
SODIUM BLD-SCNC: 127 MMOL/L (ref 136–145)
SODIUM BLD-SCNC: 132 MMOL/L (ref 136–145)
SODIUM BLD-SCNC: 134 MMOL/L (ref 136–145)
SODIUM BLD-SCNC: 134 MMOL/L (ref 136–145)
SODIUM BLD-SCNC: 135 MMOL/L (ref 136–145)
SODIUM BLD-SCNC: 135 MMOL/L (ref 136–145)
SODIUM SERPL-SCNC: 127 MMOL/L (ref 135–147)
SODIUM SERPL-SCNC: 129 MMOL/L (ref 135–147)
SODIUM SERPL-SCNC: 130 MMOL/L (ref 135–147)
SODIUM SERPL-SCNC: 130 MMOL/L (ref 135–147)
SODIUM SERPL-SCNC: 131 MMOL/L (ref 135–147)
SODIUM SERPL-SCNC: 135 MMOL/L (ref 135–147)
SODIUM SERPL-SCNC: 136 MMOL/L (ref 135–147)
SODIUM SERPL-SCNC: 137 MMOL/L (ref 135–147)
SODIUM SERPL-SCNC: 137 MMOL/L (ref 135–147)
SPECIMEN EXPIRATION DATE: NORMAL
SPECIMEN EXPIRATION DATE: NORMAL
SPECIMEN SOURCE: ABNORMAL
T WAVE AXIS: 81 DEGREES
T WAVE AXIS: 82 DEGREES
T WAVE AXIS: 83 DEGREES
TSH SERPL DL<=0.05 MIU/L-ACNC: 3.62 UIU/ML (ref 0.45–4.5)
UNIT DISPENSE STATUS: NORMAL
UNIT PRODUCT CODE: NORMAL
UNIT PRODUCT VOLUME: 241 ML
UNIT PRODUCT VOLUME: 241 ML
UNIT PRODUCT VOLUME: 256 ML
UNIT PRODUCT VOLUME: 280 ML
UNIT PRODUCT VOLUME: 280 ML
UNIT PRODUCT VOLUME: 300 ML
UNIT PRODUCT VOLUME: 350 ML
UNIT RH: NORMAL
VANCOMYCIN SERPL-MCNC: 17.2 UG/ML (ref 10–20)
VANCOMYCIN SERPL-MCNC: 8.5 UG/ML (ref 10–20)
VENT AC: 16
VENT AC: 25
VENT SIMV: 16
VENT- AC: AC
VENTRICULAR RATE: 100 BPM
VENTRICULAR RATE: 109 BPM
VENTRICULAR RATE: 111 BPM
VT SETTING VENT: 420 ML
VT SETTING VENT: 500 ML
WBC # BLD AUTO: 11.37 THOUSAND/UL (ref 4.31–10.16)
WBC # BLD AUTO: 13 THOUSAND/UL (ref 4.31–10.16)
WBC # BLD AUTO: 7 THOUSAND/UL (ref 4.31–10.16)
WBC # BLD AUTO: 7.53 THOUSAND/UL (ref 4.31–10.16)
WBC # BLD AUTO: 7.87 THOUSAND/UL (ref 4.31–10.16)
WBC # BLD AUTO: 7.95 THOUSAND/UL (ref 4.31–10.16)
WBC # BLD AUTO: 8.24 THOUSAND/UL (ref 4.31–10.16)
WBC # BLD AUTO: 8.31 THOUSAND/UL (ref 4.31–10.16)
WBC # BLD AUTO: 8.73 THOUSAND/UL (ref 4.31–10.16)
WBC # BLD AUTO: 8.78 THOUSAND/UL (ref 4.31–10.16)
WBC # BLD AUTO: 9.94 THOUSAND/UL (ref 4.31–10.16)

## 2023-01-01 PROCEDURE — 97605 NEG PRS WND THER DME<=50SQCM: CPT | Performed by: SURGERY

## 2023-01-01 PROCEDURE — C9113 INJ PANTOPRAZOLE SODIUM, VIA: HCPCS | Performed by: PHYSICIAN ASSISTANT

## 2023-01-01 PROCEDURE — 94760 N-INVAS EAR/PLS OXIMETRY 1: CPT

## 2023-01-01 PROCEDURE — 93005 ELECTROCARDIOGRAM TRACING: CPT

## 2023-01-01 PROCEDURE — 80048 BASIC METABOLIC PNL TOTAL CA: CPT | Performed by: EMERGENCY MEDICINE

## 2023-01-01 PROCEDURE — 82805 BLOOD GASES W/O2 SATURATION: CPT | Performed by: EMERGENCY MEDICINE

## 2023-01-01 PROCEDURE — 0W9G0ZZ DRAINAGE OF PERITONEAL CAVITY, OPEN APPROACH: ICD-10-PCS | Performed by: SURGERY

## 2023-01-01 PROCEDURE — P9035 PLATELET PHERES LEUKOREDUCED: HCPCS

## 2023-01-01 PROCEDURE — 85027 COMPLETE CBC AUTOMATED: CPT | Performed by: EMERGENCY MEDICINE

## 2023-01-01 PROCEDURE — 80202 ASSAY OF VANCOMYCIN: CPT | Performed by: PHYSICIAN ASSISTANT

## 2023-01-01 PROCEDURE — 94003 VENT MGMT INPAT SUBQ DAY: CPT

## 2023-01-01 PROCEDURE — 83735 ASSAY OF MAGNESIUM: CPT | Performed by: SURGERY

## 2023-01-01 PROCEDURE — 85730 THROMBOPLASTIN TIME PARTIAL: CPT | Performed by: EMERGENCY MEDICINE

## 2023-01-01 PROCEDURE — 82330 ASSAY OF CALCIUM: CPT

## 2023-01-01 PROCEDURE — 84145 PROCALCITONIN (PCT): CPT | Performed by: EMERGENCY MEDICINE

## 2023-01-01 PROCEDURE — P9016 RBC LEUKOCYTES REDUCED: HCPCS

## 2023-01-01 PROCEDURE — 82330 ASSAY OF CALCIUM: CPT | Performed by: PHYSICIAN ASSISTANT

## 2023-01-01 PROCEDURE — 82805 BLOOD GASES W/O2 SATURATION: CPT | Performed by: PHYSICIAN ASSISTANT

## 2023-01-01 PROCEDURE — 85347 COAGULATION TIME ACTIVATED: CPT | Performed by: PHYSICIAN ASSISTANT

## 2023-01-01 PROCEDURE — 82803 BLOOD GASES ANY COMBINATION: CPT

## 2023-01-01 PROCEDURE — P9037 PLATE PHERES LEUKOREDU IRRAD: HCPCS

## 2023-01-01 PROCEDURE — 82947 ASSAY GLUCOSE BLOOD QUANT: CPT

## 2023-01-01 PROCEDURE — 84100 ASSAY OF PHOSPHORUS: CPT | Performed by: PHYSICIAN ASSISTANT

## 2023-01-01 PROCEDURE — 85027 COMPLETE CBC AUTOMATED: CPT

## 2023-01-01 PROCEDURE — 0241U HB NFCT DS VIR RESP RNA 4 TRGT: CPT | Performed by: EMERGENCY MEDICINE

## 2023-01-01 PROCEDURE — 99291 CRITICAL CARE FIRST HOUR: CPT | Performed by: PHYSICIAN ASSISTANT

## 2023-01-01 PROCEDURE — 80048 BASIC METABOLIC PNL TOTAL CA: CPT | Performed by: PHYSICIAN ASSISTANT

## 2023-01-01 PROCEDURE — 87040 BLOOD CULTURE FOR BACTERIA: CPT | Performed by: EMERGENCY MEDICINE

## 2023-01-01 PROCEDURE — 0DTU0ZZ RESECTION OF OMENTUM, OPEN APPROACH: ICD-10-PCS | Performed by: SURGERY

## 2023-01-01 PROCEDURE — 30233L1 TRANSFUSION OF NONAUTOLOGOUS FRESH PLASMA INTO PERIPHERAL VEIN, PERCUTANEOUS APPROACH: ICD-10-PCS | Performed by: SURGERY

## 2023-01-01 PROCEDURE — 85014 HEMATOCRIT: CPT

## 2023-01-01 PROCEDURE — 83605 ASSAY OF LACTIC ACID: CPT | Performed by: PHYSICIAN ASSISTANT

## 2023-01-01 PROCEDURE — 85027 COMPLETE CBC AUTOMATED: CPT | Performed by: PHYSICIAN ASSISTANT

## 2023-01-01 PROCEDURE — 96375 TX/PRO/DX INJ NEW DRUG ADDON: CPT

## 2023-01-01 PROCEDURE — 87077 CULTURE AEROBIC IDENTIFY: CPT | Performed by: EMERGENCY MEDICINE

## 2023-01-01 PROCEDURE — 85007 BL SMEAR W/DIFF WBC COUNT: CPT | Performed by: EMERGENCY MEDICINE

## 2023-01-01 PROCEDURE — 0DBU0ZZ EXCISION OF OMENTUM, OPEN APPROACH: ICD-10-PCS | Performed by: SURGERY

## 2023-01-01 PROCEDURE — 87205 SMEAR GRAM STAIN: CPT | Performed by: SURGERY

## 2023-01-01 PROCEDURE — 0KBL0ZZ EXCISION OF LEFT ABDOMEN MUSCLE, OPEN APPROACH: ICD-10-PCS | Performed by: SURGERY

## 2023-01-01 PROCEDURE — 84132 ASSAY OF SERUM POTASSIUM: CPT

## 2023-01-01 PROCEDURE — 80076 HEPATIC FUNCTION PANEL: CPT

## 2023-01-01 PROCEDURE — 82140 ASSAY OF AMMONIA: CPT | Performed by: EMERGENCY MEDICINE

## 2023-01-01 PROCEDURE — 82330 ASSAY OF CALCIUM: CPT | Performed by: EMERGENCY MEDICINE

## 2023-01-01 PROCEDURE — 85384 FIBRINOGEN ACTIVITY: CPT | Performed by: PHYSICIAN ASSISTANT

## 2023-01-01 PROCEDURE — NC001 PR NO CHARGE

## 2023-01-01 PROCEDURE — 80048 BASIC METABOLIC PNL TOTAL CA: CPT | Performed by: SURGERY

## 2023-01-01 PROCEDURE — 82550 ASSAY OF CK (CPK): CPT | Performed by: EMERGENCY MEDICINE

## 2023-01-01 PROCEDURE — 80048 BASIC METABOLIC PNL TOTAL CA: CPT

## 2023-01-01 PROCEDURE — 84100 ASSAY OF PHOSPHORUS: CPT | Performed by: EMERGENCY MEDICINE

## 2023-01-01 PROCEDURE — 36620 INSERTION CATHETER ARTERY: CPT

## 2023-01-01 PROCEDURE — 88307 TISSUE EXAM BY PATHOLOGIST: CPT | Performed by: PATHOLOGY

## 2023-01-01 PROCEDURE — 99291 CRITICAL CARE FIRST HOUR: CPT | Performed by: EMERGENCY MEDICINE

## 2023-01-01 PROCEDURE — 83735 ASSAY OF MAGNESIUM: CPT | Performed by: PHYSICIAN ASSISTANT

## 2023-01-01 PROCEDURE — 82077 ASSAY SPEC XCP UR&BREATH IA: CPT | Performed by: EMERGENCY MEDICINE

## 2023-01-01 PROCEDURE — 83735 ASSAY OF MAGNESIUM: CPT | Performed by: EMERGENCY MEDICINE

## 2023-01-01 PROCEDURE — 83605 ASSAY OF LACTIC ACID: CPT | Performed by: EMERGENCY MEDICINE

## 2023-01-01 PROCEDURE — 84100 ASSAY OF PHOSPHORUS: CPT

## 2023-01-01 PROCEDURE — 86900 BLOOD TYPING SEROLOGIC ABO: CPT | Performed by: EMERGENCY MEDICINE

## 2023-01-01 PROCEDURE — 71045 X-RAY EXAM CHEST 1 VIEW: CPT

## 2023-01-01 PROCEDURE — 4A133B1 MONITORING OF ARTERIAL PRESSURE, PERIPHERAL, PERCUTANEOUS APPROACH: ICD-10-PCS | Performed by: EMERGENCY MEDICINE

## 2023-01-01 PROCEDURE — 87070 CULTURE OTHR SPECIMN AEROBIC: CPT | Performed by: SURGERY

## 2023-01-01 PROCEDURE — 84484 ASSAY OF TROPONIN QUANT: CPT | Performed by: EMERGENCY MEDICINE

## 2023-01-01 PROCEDURE — 0DB80ZZ EXCISION OF SMALL INTESTINE, OPEN APPROACH: ICD-10-PCS | Performed by: SURGERY

## 2023-01-01 PROCEDURE — 80076 HEPATIC FUNCTION PANEL: CPT | Performed by: EMERGENCY MEDICINE

## 2023-01-01 PROCEDURE — P9040 RBC LEUKOREDUCED IRRADIATED: HCPCS

## 2023-01-01 PROCEDURE — 30233P1 TRANSFUSION OF NONAUTOLOGOUS FROZEN RED CELLS INTO PERIPHERAL VEIN, PERCUTANEOUS APPROACH: ICD-10-PCS | Performed by: SURGERY

## 2023-01-01 PROCEDURE — 99291 CRITICAL CARE FIRST HOUR: CPT

## 2023-01-01 PROCEDURE — 88305 TISSUE EXAM BY PATHOLOGIST: CPT | Performed by: PATHOLOGY

## 2023-01-01 PROCEDURE — 80076 HEPATIC FUNCTION PANEL: CPT | Performed by: PHYSICIAN ASSISTANT

## 2023-01-01 PROCEDURE — 0VTB0ZZ RESECTION OF LEFT TESTIS, OPEN APPROACH: ICD-10-PCS | Performed by: SURGERY

## 2023-01-01 PROCEDURE — 84295 ASSAY OF SERUM SODIUM: CPT

## 2023-01-01 PROCEDURE — 02HV33Z INSERTION OF INFUSION DEVICE INTO SUPERIOR VENA CAVA, PERCUTANEOUS APPROACH: ICD-10-PCS | Performed by: SURGERY

## 2023-01-01 PROCEDURE — 86901 BLOOD TYPING SEROLOGIC RH(D): CPT | Performed by: NURSE ANESTHETIST, CERTIFIED REGISTERED

## 2023-01-01 PROCEDURE — 83605 ASSAY OF LACTIC ACID: CPT

## 2023-01-01 PROCEDURE — 87186 SC STD MICRODIL/AGAR DIL: CPT | Performed by: SURGERY

## 2023-01-01 PROCEDURE — 82805 BLOOD GASES W/O2 SATURATION: CPT

## 2023-01-01 PROCEDURE — 96368 THER/DIAG CONCURRENT INF: CPT

## 2023-01-01 PROCEDURE — 86850 RBC ANTIBODY SCREEN: CPT | Performed by: NURSE ANESTHETIST, CERTIFIED REGISTERED

## 2023-01-01 PROCEDURE — 99291 CRITICAL CARE FIRST HOUR: CPT | Performed by: SURGERY

## 2023-01-01 PROCEDURE — 87185 SC STD ENZYME DETCJ PER NZM: CPT | Performed by: SURGERY

## 2023-01-01 PROCEDURE — 11005 DBRDMT SKIN ABDOMINAL WALL: CPT | Performed by: SURGERY

## 2023-01-01 PROCEDURE — 03HY32Z INSERTION OF MONITORING DEVICE INTO UPPER ARTERY, PERCUTANEOUS APPROACH: ICD-10-PCS | Performed by: SURGERY

## 2023-01-01 PROCEDURE — 88342 IMHCHEM/IMCYTCHM 1ST ANTB: CPT | Performed by: PATHOLOGY

## 2023-01-01 PROCEDURE — 44120 REMOVAL OF SMALL INTESTINE: CPT | Performed by: SURGERY

## 2023-01-01 PROCEDURE — 86920 COMPATIBILITY TEST SPIN: CPT

## 2023-01-01 PROCEDURE — 96367 TX/PROPH/DG ADDL SEQ IV INF: CPT

## 2023-01-01 PROCEDURE — 82330 ASSAY OF CALCIUM: CPT | Performed by: SURGERY

## 2023-01-01 PROCEDURE — 99223 1ST HOSP IP/OBS HIGH 75: CPT | Performed by: SURGERY

## 2023-01-01 PROCEDURE — 0DTG0ZZ RESECTION OF LEFT LARGE INTESTINE, OPEN APPROACH: ICD-10-PCS | Performed by: SURGERY

## 2023-01-01 PROCEDURE — 99285 EMERGENCY DEPT VISIT HI MDM: CPT

## 2023-01-01 PROCEDURE — 87154 CUL TYP ID BLD PTHGN 6+ TRGT: CPT | Performed by: EMERGENCY MEDICINE

## 2023-01-01 PROCEDURE — 87077 CULTURE AEROBIC IDENTIFY: CPT | Performed by: SURGERY

## 2023-01-01 PROCEDURE — 99292 CRITICAL CARE ADDL 30 MIN: CPT

## 2023-01-01 PROCEDURE — 99024 POSTOP FOLLOW-UP VISIT: CPT | Performed by: SURGERY

## 2023-01-01 PROCEDURE — 85576 BLOOD PLATELET AGGREGATION: CPT | Performed by: PHYSICIAN ASSISTANT

## 2023-01-01 PROCEDURE — 85397 CLOTTING FUNCT ACTIVITY: CPT | Performed by: PHYSICIAN ASSISTANT

## 2023-01-01 PROCEDURE — 87075 CULTR BACTERIA EXCEPT BLOOD: CPT | Performed by: SURGERY

## 2023-01-01 PROCEDURE — 0DTN0ZZ RESECTION OF SIGMOID COLON, OPEN APPROACH: ICD-10-PCS | Performed by: SURGERY

## 2023-01-01 PROCEDURE — 93010 ELECTROCARDIOGRAM REPORT: CPT | Performed by: INTERNAL MEDICINE

## 2023-01-01 PROCEDURE — 44140 PARTIAL REMOVAL OF COLON: CPT | Performed by: SURGERY

## 2023-01-01 PROCEDURE — 85027 COMPLETE CBC AUTOMATED: CPT | Performed by: SURGERY

## 2023-01-01 PROCEDURE — 85018 HEMOGLOBIN: CPT | Performed by: NURSE ANESTHETIST, CERTIFIED REGISTERED

## 2023-01-01 PROCEDURE — 96365 THER/PROPH/DIAG IV INF INIT: CPT

## 2023-01-01 PROCEDURE — NC001 PR NO CHARGE: Performed by: EMERGENCY MEDICINE

## 2023-01-01 PROCEDURE — 36415 COLL VENOUS BLD VENIPUNCTURE: CPT | Performed by: EMERGENCY MEDICINE

## 2023-01-01 PROCEDURE — 86901 BLOOD TYPING SEROLOGIC RH(D): CPT | Performed by: EMERGENCY MEDICINE

## 2023-01-01 PROCEDURE — 84100 ASSAY OF PHOSPHORUS: CPT | Performed by: SURGERY

## 2023-01-01 PROCEDURE — 85027 COMPLETE CBC AUTOMATED: CPT | Performed by: NURSE ANESTHETIST, CERTIFIED REGISTERED

## 2023-01-01 PROCEDURE — 71250 CT THORAX DX C-: CPT

## 2023-01-01 PROCEDURE — 36556 INSERT NON-TUNNEL CV CATH: CPT | Performed by: SURGERY

## 2023-01-01 PROCEDURE — 74176 CT ABD & PELVIS W/O CONTRAST: CPT

## 2023-01-01 PROCEDURE — 80053 COMPREHEN METABOLIC PANEL: CPT | Performed by: PHYSICIAN ASSISTANT

## 2023-01-01 PROCEDURE — 0WBF0ZZ EXCISION OF ABDOMINAL WALL, OPEN APPROACH: ICD-10-PCS | Performed by: SURGERY

## 2023-01-01 PROCEDURE — 86850 RBC ANTIBODY SCREEN: CPT | Performed by: EMERGENCY MEDICINE

## 2023-01-01 PROCEDURE — 87040 BLOOD CULTURE FOR BACTERIA: CPT | Performed by: SURGERY

## 2023-01-01 PROCEDURE — 80143 DRUG ASSAY ACETAMINOPHEN: CPT | Performed by: EMERGENCY MEDICINE

## 2023-01-01 PROCEDURE — 86900 BLOOD TYPING SEROLOGIC ABO: CPT | Performed by: NURSE ANESTHETIST, CERTIFIED REGISTERED

## 2023-01-01 PROCEDURE — 94002 VENT MGMT INPAT INIT DAY: CPT

## 2023-01-01 PROCEDURE — 0DN80ZZ RELEASE SMALL INTESTINE, OPEN APPROACH: ICD-10-PCS | Performed by: SURGERY

## 2023-01-01 PROCEDURE — P9017 PLASMA 1 DONOR FRZ W/IN 8 HR: HCPCS

## 2023-01-01 PROCEDURE — 36620 INSERTION CATHETER ARTERY: CPT | Performed by: PHYSICIAN ASSISTANT

## 2023-01-01 PROCEDURE — 87186 SC STD MICRODIL/AGAR DIL: CPT | Performed by: EMERGENCY MEDICINE

## 2023-01-01 PROCEDURE — 83735 ASSAY OF MAGNESIUM: CPT

## 2023-01-01 PROCEDURE — C9113 INJ PANTOPRAZOLE SODIUM, VIA: HCPCS | Performed by: EMERGENCY MEDICINE

## 2023-01-01 PROCEDURE — 83690 ASSAY OF LIPASE: CPT | Performed by: EMERGENCY MEDICINE

## 2023-01-01 PROCEDURE — 80179 DRUG ASSAY SALICYLATE: CPT | Performed by: EMERGENCY MEDICINE

## 2023-01-01 PROCEDURE — 84443 ASSAY THYROID STIM HORMONE: CPT | Performed by: EMERGENCY MEDICINE

## 2023-01-01 PROCEDURE — 70450 CT HEAD/BRAIN W/O DYE: CPT

## 2023-01-01 PROCEDURE — NC001 PR NO CHARGE: Performed by: PHYSICIAN ASSISTANT

## 2023-01-01 PROCEDURE — 0KBJ0ZZ EXCISION OF LEFT THORAX MUSCLE, OPEN APPROACH: ICD-10-PCS | Performed by: SURGERY

## 2023-01-01 PROCEDURE — 82010 KETONE BODYS QUAN: CPT | Performed by: EMERGENCY MEDICINE

## 2023-01-01 PROCEDURE — 4A133J1 MONITORING OF ARTERIAL PULSE, PERIPHERAL, PERCUTANEOUS APPROACH: ICD-10-PCS | Performed by: EMERGENCY MEDICINE

## 2023-01-01 PROCEDURE — 80202 ASSAY OF VANCOMYCIN: CPT | Performed by: SURGERY

## 2023-01-01 PROCEDURE — 80048 BASIC METABOLIC PNL TOTAL CA: CPT | Performed by: NURSE ANESTHETIST, CERTIFIED REGISTERED

## 2023-01-01 PROCEDURE — 85014 HEMATOCRIT: CPT | Performed by: NURSE ANESTHETIST, CERTIFIED REGISTERED

## 2023-01-01 PROCEDURE — 85610 PROTHROMBIN TIME: CPT | Performed by: EMERGENCY MEDICINE

## 2023-01-01 PROCEDURE — 82805 BLOOD GASES W/O2 SATURATION: CPT | Performed by: SURGERY

## 2023-01-01 PROCEDURE — G1004 CDSM NDSC: HCPCS

## 2023-01-01 PROCEDURE — 85610 PROTHROMBIN TIME: CPT | Performed by: PHYSICIAN ASSISTANT

## 2023-01-01 PROCEDURE — 30233R1 TRANSFUSION OF NONAUTOLOGOUS PLATELETS INTO PERIPHERAL VEIN, PERCUTANEOUS APPROACH: ICD-10-PCS | Performed by: INTERNAL MEDICINE

## 2023-01-01 PROCEDURE — 03HY32Z INSERTION OF MONITORING DEVICE INTO UPPER ARTERY, PERCUTANEOUS APPROACH: ICD-10-PCS | Performed by: EMERGENCY MEDICINE

## 2023-01-01 RX ORDER — GLYCOPYRROLATE 0.2 MG/ML
0.1 INJECTION INTRAMUSCULAR; INTRAVENOUS EVERY 4 HOURS PRN
Status: DISCONTINUED | OUTPATIENT
Start: 2023-01-01 | End: 2023-01-01 | Stop reason: HOSPADM

## 2023-01-01 RX ORDER — POTASSIUM CHLORIDE 14.9 MG/ML
INJECTION INTRAVENOUS CONTINUOUS PRN
Status: DISCONTINUED | OUTPATIENT
Start: 2023-01-01 | End: 2023-01-01

## 2023-01-01 RX ORDER — SODIUM CHLORIDE, SODIUM LACTATE, POTASSIUM CHLORIDE, CALCIUM CHLORIDE 600; 310; 30; 20 MG/100ML; MG/100ML; MG/100ML; MG/100ML
INJECTION, SOLUTION INTRAVENOUS CONTINUOUS PRN
Status: DISCONTINUED | OUTPATIENT
Start: 2023-01-01 | End: 2023-01-01

## 2023-01-01 RX ORDER — ALBUMIN, HUMAN INJ 5% 5 %
25 SOLUTION INTRAVENOUS ONCE
Status: DISCONTINUED | OUTPATIENT
Start: 2023-01-01 | End: 2023-01-01

## 2023-01-01 RX ORDER — SODIUM CHLORIDE 9 MG/ML
INJECTION, SOLUTION INTRAVENOUS CONTINUOUS PRN
Status: DISCONTINUED | OUTPATIENT
Start: 2023-01-01 | End: 2023-01-01

## 2023-01-01 RX ORDER — CEFEPIME HYDROCHLORIDE 2 G/50ML
2000 INJECTION, SOLUTION INTRAVENOUS ONCE
Status: COMPLETED | OUTPATIENT
Start: 2023-01-01 | End: 2023-01-01

## 2023-01-01 RX ORDER — SODIUM CHLORIDE, SODIUM GLUCONATE, SODIUM ACETATE, POTASSIUM CHLORIDE, MAGNESIUM CHLORIDE, SODIUM PHOSPHATE, DIBASIC, AND POTASSIUM PHOSPHATE .53; .5; .37; .037; .03; .012; .00082 G/100ML; G/100ML; G/100ML; G/100ML; G/100ML; G/100ML; G/100ML
1000 INJECTION, SOLUTION INTRAVENOUS ONCE
Status: COMPLETED | OUTPATIENT
Start: 2023-01-01 | End: 2023-01-01

## 2023-01-01 RX ORDER — ROCURONIUM BROMIDE 10 MG/ML
INJECTION, SOLUTION INTRAVENOUS AS NEEDED
Status: DISCONTINUED | OUTPATIENT
Start: 2023-01-01 | End: 2023-01-01

## 2023-01-01 RX ORDER — SODIUM CHLORIDE, SODIUM GLUCONATE, SODIUM ACETATE, POTASSIUM CHLORIDE, MAGNESIUM CHLORIDE, SODIUM PHOSPHATE, DIBASIC, AND POTASSIUM PHOSPHATE .53; .5; .37; .037; .03; .012; .00082 G/100ML; G/100ML; G/100ML; G/100ML; G/100ML; G/100ML; G/100ML
750 INJECTION, SOLUTION INTRAVENOUS ONCE
Status: COMPLETED | OUTPATIENT
Start: 2023-01-01 | End: 2023-01-01

## 2023-01-01 RX ORDER — HYDROMORPHONE HCL/PF 1 MG/ML
0.5 SYRINGE (ML) INJECTION
Status: DISCONTINUED | OUTPATIENT
Start: 2023-01-01 | End: 2023-01-01 | Stop reason: HOSPADM

## 2023-01-01 RX ORDER — CALCIUM CHLORIDE 100 MG/ML
INJECTION INTRAVENOUS; INTRAVENTRICULAR AS NEEDED
Status: DISCONTINUED | OUTPATIENT
Start: 2023-01-01 | End: 2023-01-01

## 2023-01-01 RX ORDER — FENTANYL CITRATE 50 UG/ML
50 INJECTION, SOLUTION INTRAMUSCULAR; INTRAVENOUS
Status: DISCONTINUED | OUTPATIENT
Start: 2023-01-01 | End: 2023-01-01

## 2023-01-01 RX ORDER — ALBUMIN (HUMAN) 12.5 G/50ML
12.5 SOLUTION INTRAVENOUS ONCE
Status: CANCELLED | OUTPATIENT
Start: 2023-01-01 | End: 2023-01-01

## 2023-01-01 RX ORDER — FENTANYL CITRATE 50 UG/ML
INJECTION, SOLUTION INTRAMUSCULAR; INTRAVENOUS AS NEEDED
Status: DISCONTINUED | OUTPATIENT
Start: 2023-01-01 | End: 2023-01-01

## 2023-01-01 RX ORDER — PANTOPRAZOLE SODIUM 40 MG/10ML
40 INJECTION, POWDER, LYOPHILIZED, FOR SOLUTION INTRAVENOUS EVERY 12 HOURS SCHEDULED
Status: DISCONTINUED | OUTPATIENT
Start: 2023-01-01 | End: 2023-01-01

## 2023-01-01 RX ORDER — METRONIDAZOLE 500 MG/100ML
500 INJECTION, SOLUTION INTRAVENOUS EVERY 8 HOURS
Status: DISCONTINUED | OUTPATIENT
Start: 2023-01-01 | End: 2023-01-01

## 2023-01-01 RX ORDER — METRONIDAZOLE 500 MG/100ML
500 INJECTION, SOLUTION INTRAVENOUS EVERY 8 HOURS
Status: DISCONTINUED | OUTPATIENT
Start: 2023-01-01 | End: 2023-01-01 | Stop reason: HOSPADM

## 2023-01-01 RX ORDER — MAGNESIUM SULFATE 1 G/100ML
1 INJECTION INTRAVENOUS ONCE
Status: COMPLETED | OUTPATIENT
Start: 2023-01-01 | End: 2023-01-01

## 2023-01-01 RX ORDER — KETAMINE HCL IN NACL, ISO-OSM 100MG/10ML
SYRINGE (ML) INJECTION AS NEEDED
Status: DISCONTINUED | OUTPATIENT
Start: 2023-01-01 | End: 2023-01-01

## 2023-01-01 RX ORDER — CHLORHEXIDINE GLUCONATE ORAL RINSE 1.2 MG/ML
15 SOLUTION DENTAL EVERY 12 HOURS SCHEDULED
Status: DISCONTINUED | OUTPATIENT
Start: 2023-01-01 | End: 2023-01-01

## 2023-01-01 RX ORDER — LINEZOLID 2 MG/ML
600 INJECTION, SOLUTION INTRAVENOUS EVERY 12 HOURS
Status: DISCONTINUED | OUTPATIENT
Start: 2023-01-01 | End: 2023-01-01

## 2023-01-01 RX ORDER — MAGNESIUM HYDROXIDE 1200 MG/15ML
LIQUID ORAL AS NEEDED
Status: DISCONTINUED | OUTPATIENT
Start: 2023-01-01 | End: 2023-01-01 | Stop reason: HOSPADM

## 2023-01-01 RX ORDER — VASOPRESSIN 20 U/ML
INJECTION PARENTERAL AS NEEDED
Status: DISCONTINUED | OUTPATIENT
Start: 2023-01-01 | End: 2023-01-01

## 2023-01-01 RX ORDER — LORAZEPAM 2 MG/ML
1 INJECTION INTRAMUSCULAR
Status: DISCONTINUED | OUTPATIENT
Start: 2023-01-01 | End: 2023-01-01 | Stop reason: HOSPADM

## 2023-01-01 RX ORDER — SODIUM CHLORIDE, SODIUM GLUCONATE, SODIUM ACETATE, POTASSIUM CHLORIDE, MAGNESIUM CHLORIDE, SODIUM PHOSPHATE, DIBASIC, AND POTASSIUM PHOSPHATE .53; .5; .37; .037; .03; .012; .00082 G/100ML; G/100ML; G/100ML; G/100ML; G/100ML; G/100ML; G/100ML
2500 INJECTION, SOLUTION INTRAVENOUS ONCE
Status: COMPLETED | OUTPATIENT
Start: 2023-01-01 | End: 2023-01-01

## 2023-01-01 RX ORDER — SODIUM CHLORIDE, SODIUM GLUCONATE, SODIUM ACETATE, POTASSIUM CHLORIDE, MAGNESIUM CHLORIDE, SODIUM PHOSPHATE, DIBASIC, AND POTASSIUM PHOSPHATE .53; .5; .37; .037; .03; .012; .00082 G/100ML; G/100ML; G/100ML; G/100ML; G/100ML; G/100ML; G/100ML
1500 INJECTION, SOLUTION INTRAVENOUS ONCE
Status: COMPLETED | OUTPATIENT
Start: 2023-01-01 | End: 2023-01-01

## 2023-01-01 RX ORDER — PROPOFOL 10 MG/ML
5-50 INJECTION, EMULSION INTRAVENOUS
Status: DISCONTINUED | OUTPATIENT
Start: 2023-01-01 | End: 2023-01-01

## 2023-01-01 RX ORDER — DEXTROSE MONOHYDRATE 25 G/50ML
INJECTION, SOLUTION INTRAVENOUS
Status: COMPLETED
Start: 2023-01-01 | End: 2023-01-01

## 2023-01-01 RX ORDER — PHENYLEPHRINE HCL IN 0.9% NACL 1 MG/10 ML
SYRINGE (ML) INTRAVENOUS AS NEEDED
Status: DISCONTINUED | OUTPATIENT
Start: 2023-01-01 | End: 2023-01-01

## 2023-01-01 RX ORDER — ALBUMIN, HUMAN INJ 5% 5 %
SOLUTION INTRAVENOUS CONTINUOUS PRN
Status: DISCONTINUED | OUTPATIENT
Start: 2023-01-01 | End: 2023-01-01

## 2023-01-01 RX ORDER — ALBUTEROL SULFATE 90 UG/1
AEROSOL, METERED RESPIRATORY (INHALATION) AS NEEDED
Status: DISCONTINUED | OUTPATIENT
Start: 2023-01-01 | End: 2023-01-01

## 2023-01-01 RX ORDER — BISACODYL 10 MG
10 SUPPOSITORY, RECTAL RECTAL DAILY PRN
Status: DISCONTINUED | OUTPATIENT
Start: 2023-01-01 | End: 2023-01-01 | Stop reason: HOSPADM

## 2023-01-01 RX ORDER — MIDAZOLAM HYDROCHLORIDE 2 MG/2ML
INJECTION, SOLUTION INTRAMUSCULAR; INTRAVENOUS AS NEEDED
Status: DISCONTINUED | OUTPATIENT
Start: 2023-01-01 | End: 2023-01-01

## 2023-01-01 RX ORDER — NOREPINEPHRINE BITARTRATE 1 MG/ML
INJECTION, SOLUTION INTRAVENOUS
Status: DISPENSED
Start: 2023-01-01 | End: 2023-01-01

## 2023-01-01 RX ORDER — CLINDAMYCIN PHOSPHATE 600 MG/50ML
INJECTION INTRAVENOUS AS NEEDED
Status: DISCONTINUED | OUTPATIENT
Start: 2023-01-01 | End: 2023-01-01

## 2023-01-01 RX ORDER — CALCIUM CHLORIDE 100 MG/ML
1 INJECTION INTRAVENOUS; INTRAVENTRICULAR ONCE
Status: COMPLETED | OUTPATIENT
Start: 2023-01-01 | End: 2023-01-01

## 2023-01-01 RX ORDER — POTASSIUM CHLORIDE 14.9 MG/ML
20 INJECTION INTRAVENOUS
Status: DISCONTINUED | OUTPATIENT
Start: 2023-01-01 | End: 2023-01-01

## 2023-01-01 RX ORDER — HALOPERIDOL 5 MG/ML
0.5 INJECTION INTRAMUSCULAR EVERY 2 HOUR PRN
Status: DISCONTINUED | OUTPATIENT
Start: 2023-01-01 | End: 2023-01-01 | Stop reason: HOSPADM

## 2023-01-01 RX ORDER — DEXMEDETOMIDINE HYDROCHLORIDE 4 UG/ML
.1-.7 INJECTION, SOLUTION INTRAVENOUS
Status: DISCONTINUED | OUTPATIENT
Start: 2023-01-01 | End: 2023-01-01

## 2023-01-01 RX ORDER — METRONIDAZOLE 500 MG/100ML
INJECTION, SOLUTION INTRAVENOUS CONTINUOUS PRN
Status: DISCONTINUED | OUTPATIENT
Start: 2023-01-01 | End: 2023-01-01

## 2023-01-01 RX ORDER — VANCOMYCIN HYDROCHLORIDE 1 G/200ML
15 INJECTION, SOLUTION INTRAVENOUS EVERY 12 HOURS
Status: DISCONTINUED | OUTPATIENT
Start: 2023-01-01 | End: 2023-01-01

## 2023-01-01 RX ORDER — HEPARIN SODIUM 5000 [USP'U]/ML
5000 INJECTION, SOLUTION INTRAVENOUS; SUBCUTANEOUS EVERY 8 HOURS SCHEDULED
Status: DISCONTINUED | OUTPATIENT
Start: 2023-01-01 | End: 2023-01-01

## 2023-01-01 RX ORDER — CLINDAMYCIN PHOSPHATE 600 MG/50ML
600 INJECTION INTRAVENOUS EVERY 8 HOURS
Status: DISCONTINUED | OUTPATIENT
Start: 2023-01-01 | End: 2023-01-01

## 2023-01-01 RX ORDER — CLINDAMYCIN PHOSPHATE 900 MG/50ML
900 INJECTION INTRAVENOUS EVERY 8 HOURS
Status: DISCONTINUED | OUTPATIENT
Start: 2023-01-01 | End: 2023-01-01

## 2023-01-01 RX ORDER — VANCOMYCIN HYDROCHLORIDE 1 G/200ML
15 INJECTION, SOLUTION INTRAVENOUS DAILY PRN
Status: DISCONTINUED | OUTPATIENT
Start: 2023-01-01 | End: 2023-01-01

## 2023-01-01 RX ORDER — MAGNESIUM SULFATE 1 G/100ML
INJECTION INTRAVENOUS AS NEEDED
Status: DISCONTINUED | OUTPATIENT
Start: 2023-01-01 | End: 2023-01-01

## 2023-01-01 RX ORDER — PROPOFOL 10 MG/ML
INJECTION, EMULSION INTRAVENOUS AS NEEDED
Status: DISCONTINUED | OUTPATIENT
Start: 2023-01-01 | End: 2023-01-01

## 2023-01-01 RX ORDER — POTASSIUM CHLORIDE 14.9 MG/ML
20 INJECTION INTRAVENOUS
Status: COMPLETED | OUTPATIENT
Start: 2023-01-01 | End: 2023-01-01

## 2023-01-01 RX ORDER — POTASSIUM CHLORIDE 29.8 MG/ML
40 INJECTION INTRAVENOUS ONCE
Status: COMPLETED | OUTPATIENT
Start: 2023-01-01 | End: 2023-01-01

## 2023-01-01 RX ORDER — FENTANYL CITRATE-0.9 % NACL/PF 10 MCG/ML
100 PLASTIC BAG, INJECTION (ML) INTRAVENOUS CONTINUOUS
Status: DISCONTINUED | OUTPATIENT
Start: 2023-01-01 | End: 2023-01-01 | Stop reason: HOSPADM

## 2023-01-01 RX ORDER — DEXTROSE MONOHYDRATE 25 G/50ML
INJECTION, SOLUTION INTRAVENOUS AS NEEDED
Status: DISCONTINUED | OUTPATIENT
Start: 2023-01-01 | End: 2023-01-01

## 2023-01-01 RX ORDER — POTASSIUM CHLORIDE 20 MEQ/1
40 TABLET, EXTENDED RELEASE ORAL ONCE
Status: COMPLETED | OUTPATIENT
Start: 2023-01-01 | End: 2023-01-01

## 2023-01-01 RX ORDER — SODIUM CHLORIDE, SODIUM GLUCONATE, SODIUM ACETATE, POTASSIUM CHLORIDE, MAGNESIUM CHLORIDE, SODIUM PHOSPHATE, DIBASIC, AND POTASSIUM PHOSPHATE .53; .5; .37; .037; .03; .012; .00082 G/100ML; G/100ML; G/100ML; G/100ML; G/100ML; G/100ML; G/100ML
125 INJECTION, SOLUTION INTRAVENOUS CONTINUOUS
Status: DISCONTINUED | OUTPATIENT
Start: 2023-01-01 | End: 2023-01-01

## 2023-01-01 RX ORDER — SODIUM CHLORIDE, SODIUM GLUCONATE, SODIUM ACETATE, POTASSIUM CHLORIDE, MAGNESIUM CHLORIDE, SODIUM PHOSPHATE, DIBASIC, AND POTASSIUM PHOSPHATE .53; .5; .37; .037; .03; .012; .00082 G/100ML; G/100ML; G/100ML; G/100ML; G/100ML; G/100ML; G/100ML
500 INJECTION, SOLUTION INTRAVENOUS ONCE
Status: DISCONTINUED | OUTPATIENT
Start: 2023-01-01 | End: 2023-01-01

## 2023-01-01 RX ORDER — DEXTROSE AND SODIUM CHLORIDE 5; .9 G/100ML; G/100ML
999 INJECTION, SOLUTION INTRAVENOUS ONCE
Status: COMPLETED | OUTPATIENT
Start: 2023-01-01 | End: 2023-01-01

## 2023-01-01 RX ORDER — POTASSIUM CHLORIDE 14.9 MG/ML
20 INJECTION INTRAVENOUS ONCE
Status: COMPLETED | OUTPATIENT
Start: 2023-01-01 | End: 2023-01-01

## 2023-01-01 RX ORDER — CALCIUM GLUCONATE 20 MG/ML
1 INJECTION, SOLUTION INTRAVENOUS ONCE
Status: COMPLETED | OUTPATIENT
Start: 2023-01-01 | End: 2023-01-01

## 2023-01-01 RX ORDER — CHLORHEXIDINE GLUCONATE ORAL RINSE 1.2 MG/ML
15 SOLUTION DENTAL EVERY 12 HOURS SCHEDULED
Status: DISCONTINUED | OUTPATIENT
Start: 2023-01-01 | End: 2023-01-01 | Stop reason: HOSPADM

## 2023-01-01 RX ORDER — SODIUM CHLORIDE, SODIUM GLUCONATE, SODIUM ACETATE, POTASSIUM CHLORIDE, MAGNESIUM CHLORIDE, SODIUM PHOSPHATE, DIBASIC, AND POTASSIUM PHOSPHATE .53; .5; .37; .037; .03; .012; .00082 G/100ML; G/100ML; G/100ML; G/100ML; G/100ML; G/100ML; G/100ML
500 INJECTION, SOLUTION INTRAVENOUS ONCE
Status: COMPLETED | OUTPATIENT
Start: 2023-01-01 | End: 2023-01-01

## 2023-01-01 RX ORDER — ALBUMIN (HUMAN) 12.5 G/50ML
25 SOLUTION INTRAVENOUS EVERY 6 HOURS
Status: COMPLETED | OUTPATIENT
Start: 2023-01-01 | End: 2023-01-01

## 2023-01-01 RX ADMIN — ROCURONIUM BROMIDE 25 MG: 10 INJECTION, SOLUTION INTRAVENOUS at 19:46

## 2023-01-01 RX ADMIN — METRONIDAZOLE 500 MG: 500 INJECTION, SOLUTION INTRAVENOUS at 17:23

## 2023-01-01 RX ADMIN — NOREPINEPHRINE BITARTRATE 20 MCG/MIN: 1 SOLUTION INTRAVENOUS at 11:09

## 2023-01-01 RX ADMIN — NOREPINEPHRINE BITARTRATE 16 MCG: 1 SOLUTION INTRAVENOUS at 18:22

## 2023-01-01 RX ADMIN — SODIUM CHLORIDE: 0.9 INJECTION, SOLUTION INTRAVENOUS at 16:41

## 2023-01-01 RX ADMIN — EPINEPHRINE 2 MCG/MIN: 1 INJECTION, SOLUTION, CONCENTRATE INTRAVENOUS at 08:05

## 2023-01-01 RX ADMIN — HYDROCORTISONE SODIUM SUCCINATE 50 MG: 100 INJECTION, POWDER, FOR SOLUTION INTRAMUSCULAR; INTRAVENOUS at 05:26

## 2023-01-01 RX ADMIN — VASOPRESSIN 0.04 UNITS/MIN: 20 INJECTION INTRAVENOUS at 23:29

## 2023-01-01 RX ADMIN — MIDAZOLAM 4 MG: 1 INJECTION INTRAMUSCULAR; INTRAVENOUS at 09:42

## 2023-01-01 RX ADMIN — THIAMINE HYDROCHLORIDE: 100 INJECTION, SOLUTION INTRAMUSCULAR; INTRAVENOUS at 12:10

## 2023-01-01 RX ADMIN — CALCIUM GLUCONATE 3 G: 98 INJECTION, SOLUTION INTRAVENOUS at 01:36

## 2023-01-01 RX ADMIN — CALCIUM GLUCONATE 3 G: 98 INJECTION, SOLUTION INTRAVENOUS at 20:10

## 2023-01-01 RX ADMIN — NOREPINEPHRINE BITARTRATE 8 MCG/MIN: 1 SOLUTION INTRAVENOUS at 23:10

## 2023-01-01 RX ADMIN — SODIUM CHLORIDE: 9 INJECTION, SOLUTION INTRAVENOUS at 08:05

## 2023-01-01 RX ADMIN — CALCIUM CHLORIDE 0.5 G: 100 INJECTION INTRAVENOUS; INTRAVENTRICULAR at 08:11

## 2023-01-01 RX ADMIN — NOREPINEPHRINE BITARTRATE 12 MCG/MIN: 1 INJECTION INTRAVENOUS at 21:40

## 2023-01-01 RX ADMIN — NOREPINEPHRINE BITARTRATE 25 MCG/MIN: 1 SOLUTION INTRAVENOUS at 17:51

## 2023-01-01 RX ADMIN — SODIUM CHLORIDE, SODIUM GLUCONATE, SODIUM ACETATE, POTASSIUM CHLORIDE AND MAGNESIUM CHLORIDE 125 ML/HR: 526; 502; 368; 37; 30 INJECTION, SOLUTION INTRAVENOUS at 12:51

## 2023-01-01 RX ADMIN — Medication 200 MCG: at 18:10

## 2023-01-01 RX ADMIN — VASOPRESSIN 4 UNITS: 20 INJECTION, SOLUTION INTRAMUSCULAR; SUBCUTANEOUS at 07:56

## 2023-01-01 RX ADMIN — POTASSIUM CHLORIDE: 14.9 INJECTION, SOLUTION INTRAVENOUS at 19:34

## 2023-01-01 RX ADMIN — ALBUTEROL SULFATE 2 PUFF: 90 AEROSOL, METERED RESPIRATORY (INHALATION) at 17:31

## 2023-01-01 RX ADMIN — CALCIUM CHLORIDE 0.5 G: 100 INJECTION INTRAVENOUS; INTRAVENTRICULAR at 17:59

## 2023-01-01 RX ADMIN — NOREPINEPHRINE BITARTRATE 15 MCG/MIN: 1 SOLUTION INTRAVENOUS at 01:46

## 2023-01-01 RX ADMIN — CLINDAMYCIN PHOSPHATE 900 MG: 900 INJECTION, SOLUTION INTRAVENOUS at 06:18

## 2023-01-01 RX ADMIN — LINEZOLID 600 MG: 600 INJECTION, SOLUTION INTRAVENOUS at 05:24

## 2023-01-01 RX ADMIN — VASOPRESSIN 0.04 UNITS/MIN: 20 INJECTION INTRAVENOUS at 11:10

## 2023-01-01 RX ADMIN — VASOPRESSIN 0.04 UNITS/MIN: 20 INJECTION INTRAVENOUS at 14:40

## 2023-01-01 RX ADMIN — ROCURONIUM BROMIDE 50 MG: 10 INJECTION, SOLUTION INTRAVENOUS at 16:34

## 2023-01-01 RX ADMIN — PHENYLEPHRINE HYDROCHLORIDE 200 MCG: 10 INJECTION INTRAVENOUS at 19:54

## 2023-01-01 RX ADMIN — CALCIUM CHLORIDE 0.5 G: 100 INJECTION INTRAVENOUS; INTRAVENTRICULAR at 08:20

## 2023-01-01 RX ADMIN — NOREPINEPHRINE BITARTRATE 16 MCG: 1 SOLUTION INTRAVENOUS at 18:10

## 2023-01-01 RX ADMIN — ROCURONIUM BROMIDE 50 MG: 10 INJECTION, SOLUTION INTRAVENOUS at 18:20

## 2023-01-01 RX ADMIN — CALCIUM CHLORIDE 0.5 G: 100 INJECTION INTRAVENOUS; INTRAVENTRICULAR at 22:37

## 2023-01-01 RX ADMIN — SODIUM CHLORIDE, SODIUM GLUCONATE, SODIUM ACETATE, POTASSIUM CHLORIDE, MAGNESIUM CHLORIDE, SODIUM PHOSPHATE, DIBASIC, AND POTASSIUM PHOSPHATE 1000 ML: .53; .5; .37; .037; .03; .012; .00082 INJECTION, SOLUTION INTRAVENOUS at 06:50

## 2023-01-01 RX ADMIN — CALCIUM CHLORIDE 0.5 G: 100 INJECTION INTRAVENOUS; INTRAVENTRICULAR at 22:14

## 2023-01-01 RX ADMIN — Medication 100 MCG/HR: at 09:07

## 2023-01-01 RX ADMIN — CHLORHEXIDINE GLUCONATE 15 ML: 1.2 SOLUTION ORAL at 09:08

## 2023-01-01 RX ADMIN — HYDROCORTISONE SODIUM SUCCINATE 50 MG: 100 INJECTION, POWDER, FOR SOLUTION INTRAMUSCULAR; INTRAVENOUS at 17:57

## 2023-01-01 RX ADMIN — POTASSIUM CHLORIDE 20 MEQ: 14.9 INJECTION, SOLUTION INTRAVENOUS at 16:23

## 2023-01-01 RX ADMIN — MIDAZOLAM 4 MG: 1 INJECTION INTRAMUSCULAR; INTRAVENOUS at 07:57

## 2023-01-01 RX ADMIN — THIAMINE HYDROCHLORIDE 200 MG: 100 INJECTION, SOLUTION INTRAMUSCULAR; INTRAVENOUS at 13:48

## 2023-01-01 RX ADMIN — VASOPRESSIN 0.04 UNITS/MIN: 20 INJECTION INTRAVENOUS at 10:12

## 2023-01-01 RX ADMIN — NOREPINEPHRINE BITARTRATE 10 MCG/MIN: 1 SOLUTION INTRAVENOUS at 19:40

## 2023-01-01 RX ADMIN — PANTOPRAZOLE SODIUM 40 MG: 40 INJECTION, POWDER, FOR SOLUTION INTRAVENOUS at 21:54

## 2023-01-01 RX ADMIN — POTASSIUM CHLORIDE 40 MEQ: 1500 TABLET, EXTENDED RELEASE ORAL at 17:12

## 2023-01-01 RX ADMIN — NOREPINEPHRINE BITARTRATE 8 MCG/MIN: 1 SOLUTION INTRAVENOUS at 18:09

## 2023-01-01 RX ADMIN — SODIUM CHLORIDE, SODIUM GLUCONATE, SODIUM ACETATE, POTASSIUM CHLORIDE, MAGNESIUM CHLORIDE, SODIUM PHOSPHATE, DIBASIC, AND POTASSIUM PHOSPHATE 1000 ML: .53; .5; .37; .037; .03; .012; .00082 INJECTION, SOLUTION INTRAVENOUS at 07:00

## 2023-01-01 RX ADMIN — VANCOMYCIN HYDROCHLORIDE 1000 MG: 5 INJECTION, POWDER, LYOPHILIZED, FOR SOLUTION INTRAVENOUS at 16:27

## 2023-01-01 RX ADMIN — CEFEPIME 2000 MG: 2 INJECTION, POWDER, FOR SOLUTION INTRAVENOUS at 11:59

## 2023-01-01 RX ADMIN — METRONIDAZOLE: 500 INJECTION, SOLUTION INTRAVENOUS at 17:16

## 2023-01-01 RX ADMIN — CALCIUM CHLORIDE 0.5 G: 100 INJECTION INTRAVENOUS; INTRAVENTRICULAR at 08:33

## 2023-01-01 RX ADMIN — ALBUMIN (HUMAN) 25 G: 0.25 INJECTION, SOLUTION INTRAVENOUS at 16:19

## 2023-01-01 RX ADMIN — CLINDAMYCIN PHOSPHATE 900 MG: 900 INJECTION, SOLUTION INTRAVENOUS at 14:13

## 2023-01-01 RX ADMIN — POTASSIUM CHLORIDE 40 MEQ: 29.8 INJECTION, SOLUTION INTRAVENOUS at 01:49

## 2023-01-01 RX ADMIN — CALCIUM CHLORIDE 1 G: 100 INJECTION INTRAVENOUS; INTRAVENTRICULAR at 07:21

## 2023-01-01 RX ADMIN — CHLORHEXIDINE GLUCONATE 15 ML: 1.2 SOLUTION ORAL at 21:54

## 2023-01-01 RX ADMIN — SODIUM CHLORIDE, SODIUM GLUCONATE, SODIUM ACETATE, POTASSIUM CHLORIDE AND MAGNESIUM CHLORIDE 125 ML/HR: 526; 502; 368; 37; 30 INJECTION, SOLUTION INTRAVENOUS at 07:07

## 2023-01-01 RX ADMIN — PHENYLEPHRINE HYDROCHLORIDE 200 MCG: 10 INJECTION INTRAVENOUS at 18:19

## 2023-01-01 RX ADMIN — MAGNESIUM SULFATE IN DEXTROSE 1 G: 10 INJECTION, SOLUTION INTRAVENOUS at 19:38

## 2023-01-01 RX ADMIN — SODIUM BICARBONATE 50 MEQ: 84 INJECTION, SOLUTION INTRAVENOUS at 08:19

## 2023-01-01 RX ADMIN — ALBUMIN (HUMAN): 12.5 INJECTION, SOLUTION INTRAVENOUS at 19:13

## 2023-01-01 RX ADMIN — SODIUM CHLORIDE, SODIUM GLUCONATE, SODIUM ACETATE, POTASSIUM CHLORIDE AND MAGNESIUM CHLORIDE 125 ML/HR: 526; 502; 368; 37; 30 INJECTION, SOLUTION INTRAVENOUS at 01:32

## 2023-01-01 RX ADMIN — NOREPINEPHRINE BITARTRATE 24 MCG: 1 SOLUTION INTRAVENOUS at 18:06

## 2023-01-01 RX ADMIN — CEFEPIME 2000 MG: 2 INJECTION, POWDER, FOR SOLUTION INTRAVENOUS at 23:29

## 2023-01-01 RX ADMIN — GLYCOPYRROLATE 0.1 MG: 0.2 INJECTION, SOLUTION INTRAMUSCULAR; INTRAVENOUS at 13:51

## 2023-01-01 RX ADMIN — PROPOFOL 80 MG: 10 INJECTION, EMULSION INTRAVENOUS at 18:06

## 2023-01-01 RX ADMIN — CHLORHEXIDINE GLUCONATE 15 ML: 1.2 SOLUTION ORAL at 08:13

## 2023-01-01 RX ADMIN — Medication 100 MCG/HR: at 18:27

## 2023-01-01 RX ADMIN — PANTOPRAZOLE SODIUM 40 MG: 40 INJECTION, POWDER, FOR SOLUTION INTRAVENOUS at 08:54

## 2023-01-01 RX ADMIN — DEXTROSE MONOHYDRATE 50 ML: 25 INJECTION, SOLUTION INTRAVENOUS at 08:29

## 2023-01-01 RX ADMIN — POTASSIUM CHLORIDE: 14.9 INJECTION, SOLUTION INTRAVENOUS at 21:51

## 2023-01-01 RX ADMIN — CEFEPIME HYDROCHLORIDE 2000 MG: 2 INJECTION, SOLUTION INTRAVENOUS at 14:54

## 2023-01-01 RX ADMIN — SODIUM CHLORIDE, SODIUM GLUCONATE, SODIUM ACETATE, POTASSIUM CHLORIDE AND MAGNESIUM CHLORIDE 125 ML/HR: 526; 502; 368; 37; 30 INJECTION, SOLUTION INTRAVENOUS at 01:55

## 2023-01-01 RX ADMIN — LINEZOLID 600 MG: 600 INJECTION, SOLUTION INTRAVENOUS at 20:55

## 2023-01-01 RX ADMIN — SODIUM CHLORIDE, SODIUM GLUCONATE, SODIUM ACETATE, POTASSIUM CHLORIDE AND MAGNESIUM CHLORIDE 125 ML/HR: 526; 502; 368; 37; 30 INJECTION, SOLUTION INTRAVENOUS at 04:31

## 2023-01-01 RX ADMIN — Medication 0.2 MCG/KG/HR: at 07:15

## 2023-01-01 RX ADMIN — CALCIUM GLUCONATE 3 G: 98 INJECTION, SOLUTION INTRAVENOUS at 23:11

## 2023-01-01 RX ADMIN — SODIUM CHLORIDE, SODIUM GLUCONATE, SODIUM ACETATE, POTASSIUM CHLORIDE AND MAGNESIUM CHLORIDE 1500 ML: 526; 502; 368; 37; 30 INJECTION, SOLUTION INTRAVENOUS at 05:10

## 2023-01-01 RX ADMIN — Medication 50 MG: at 18:06

## 2023-01-01 RX ADMIN — METRONIDAZOLE 500 MG: 500 INJECTION, SOLUTION INTRAVENOUS at 09:08

## 2023-01-01 RX ADMIN — VASOPRESSIN 0.04 UNITS/MIN: 20 INJECTION INTRAVENOUS at 20:32

## 2023-01-01 RX ADMIN — Medication 100 MCG/HR: at 05:10

## 2023-01-01 RX ADMIN — LORAZEPAM 1 MG: 2 INJECTION INTRAMUSCULAR; INTRAVENOUS at 13:52

## 2023-01-01 RX ADMIN — SODIUM CHLORIDE: 0.9 INJECTION, SOLUTION INTRAVENOUS at 21:40

## 2023-01-01 RX ADMIN — MIDAZOLAM 2 MG: 1 INJECTION INTRAMUSCULAR; INTRAVENOUS at 16:44

## 2023-01-01 RX ADMIN — CALCIUM GLUCONATE 1 G: 20 INJECTION, SOLUTION INTRAVENOUS at 13:15

## 2023-01-01 RX ADMIN — CALCIUM GLUCONATE 3 G: 98 INJECTION, SOLUTION INTRAVENOUS at 16:42

## 2023-01-01 RX ADMIN — DEXTROSE MONOHYDRATE: 25 INJECTION, SOLUTION INTRAVENOUS at 11:00

## 2023-01-01 RX ADMIN — CALCIUM CHLORIDE 1 G: 100 INJECTION PARENTERAL at 19:05

## 2023-01-01 RX ADMIN — NOREPINEPHRINE BITARTRATE 16 MCG: 1 SOLUTION INTRAVENOUS at 19:22

## 2023-01-01 RX ADMIN — NOREPINEPHRINE BITARTRATE 7 MCG/MIN: 1 SOLUTION INTRAVENOUS at 04:40

## 2023-01-01 RX ADMIN — SODIUM BICARBONATE 50 MEQ: 84 INJECTION INTRAVENOUS at 07:21

## 2023-01-01 RX ADMIN — ROCURONIUM BROMIDE 40 MG: 10 INJECTION, SOLUTION INTRAVENOUS at 21:54

## 2023-01-01 RX ADMIN — ALBUMIN (HUMAN): 12.5 INJECTION, SOLUTION INTRAVENOUS at 16:54

## 2023-01-01 RX ADMIN — HYDROCORTISONE SODIUM SUCCINATE 50 MG: 100 INJECTION, POWDER, FOR SOLUTION INTRAMUSCULAR; INTRAVENOUS at 23:00

## 2023-01-01 RX ADMIN — FENTANYL CITRATE 50 MCG: 50 INJECTION INTRAMUSCULAR; INTRAVENOUS at 04:50

## 2023-01-01 RX ADMIN — CEFEPIME 2000 MG: 2 INJECTION, POWDER, FOR SOLUTION INTRAVENOUS at 04:19

## 2023-01-01 RX ADMIN — METRONIDAZOLE: 500 INJECTION, SOLUTION INTRAVENOUS at 08:21

## 2023-01-01 RX ADMIN — VANCOMYCIN HYDROCHLORIDE 1250 MG: 10 INJECTION, POWDER, LYOPHILIZED, FOR SOLUTION INTRAVENOUS at 11:17

## 2023-01-01 RX ADMIN — HYDROCORTISONE SODIUM SUCCINATE 50 MG: 100 INJECTION, POWDER, FOR SOLUTION INTRAMUSCULAR; INTRAVENOUS at 07:07

## 2023-01-01 RX ADMIN — FENTANYL CITRATE 100 MCG: 50 INJECTION, SOLUTION INTRAMUSCULAR; INTRAVENOUS at 07:58

## 2023-01-01 RX ADMIN — SODIUM CHLORIDE, SODIUM GLUCONATE, SODIUM ACETATE, POTASSIUM CHLORIDE AND MAGNESIUM CHLORIDE 1000 ML: 526; 502; 368; 37; 30 INJECTION, SOLUTION INTRAVENOUS at 13:06

## 2023-01-01 RX ADMIN — SODIUM CHLORIDE, SODIUM GLUCONATE, SODIUM ACETATE, POTASSIUM CHLORIDE AND MAGNESIUM CHLORIDE 125 ML/HR: 526; 502; 368; 37; 30 INJECTION, SOLUTION INTRAVENOUS at 20:54

## 2023-01-01 RX ADMIN — CALCIUM GLUCONATE 3 G: 98 INJECTION, SOLUTION INTRAVENOUS at 07:22

## 2023-01-01 RX ADMIN — Medication 100 MCG: at 19:17

## 2023-01-01 RX ADMIN — ALBUMIN (HUMAN) 25 G: 0.25 INJECTION, SOLUTION INTRAVENOUS at 10:06

## 2023-01-01 RX ADMIN — PANTOPRAZOLE SODIUM 40 MG: 40 INJECTION, POWDER, FOR SOLUTION INTRAVENOUS at 20:10

## 2023-01-01 RX ADMIN — CLINDAMYCIN PHOSPHATE 600 MG: 600 INJECTION, SOLUTION INTRAVENOUS at 22:04

## 2023-01-01 RX ADMIN — DEXTROSE AND SODIUM CHLORIDE 999 ML/HR: 5; .9 INJECTION, SOLUTION INTRAVENOUS at 16:20

## 2023-01-01 RX ADMIN — Medication 200 MCG: at 18:06

## 2023-01-01 RX ADMIN — CEFEPIME 2000 MG: 2 INJECTION, POWDER, FOR SOLUTION INTRAVENOUS at 01:33

## 2023-01-01 RX ADMIN — MIDAZOLAM 2 MG: 1 INJECTION INTRAMUSCULAR; INTRAVENOUS at 18:06

## 2023-01-01 RX ADMIN — HEPARIN SODIUM 5000 UNITS: 5000 INJECTION INTRAVENOUS; SUBCUTANEOUS at 06:45

## 2023-01-01 RX ADMIN — VASOPRESSIN 0.04 UNITS/MIN: 20 INJECTION INTRAVENOUS at 04:31

## 2023-01-01 RX ADMIN — SODIUM CHLORIDE, SODIUM LACTATE, POTASSIUM CHLORIDE, AND CALCIUM CHLORIDE: .6; .31; .03; .02 INJECTION, SOLUTION INTRAVENOUS at 17:50

## 2023-01-01 RX ADMIN — SODIUM CHLORIDE, SODIUM GLUCONATE, SODIUM ACETATE, POTASSIUM CHLORIDE, MAGNESIUM CHLORIDE, SODIUM PHOSPHATE, DIBASIC, AND POTASSIUM PHOSPHATE 750 ML: .53; .5; .37; .037; .03; .012; .00082 INJECTION, SOLUTION INTRAVENOUS at 08:15

## 2023-01-01 RX ADMIN — HYDROCORTISONE SODIUM SUCCINATE 50 MG: 100 INJECTION, POWDER, FOR SOLUTION INTRAMUSCULAR; INTRAVENOUS at 01:33

## 2023-01-01 RX ADMIN — LINEZOLID 600 MG: 600 INJECTION, SOLUTION INTRAVENOUS at 18:30

## 2023-01-01 RX ADMIN — POTASSIUM CHLORIDE: 14.9 INJECTION, SOLUTION INTRAVENOUS at 19:07

## 2023-01-01 RX ADMIN — ROCURONIUM BROMIDE 50 MG: 10 INJECTION, SOLUTION INTRAVENOUS at 08:25

## 2023-01-01 RX ADMIN — METRONIDAZOLE 500 MG: 500 INJECTION, SOLUTION INTRAVENOUS at 02:00

## 2023-01-01 RX ADMIN — SODIUM CHLORIDE, SODIUM LACTATE, POTASSIUM CHLORIDE, AND CALCIUM CHLORIDE: .6; .31; .03; .02 INJECTION, SOLUTION INTRAVENOUS at 18:37

## 2023-01-01 RX ADMIN — NOREPINEPHRINE BITARTRATE 12 MCG/MIN: 1 SOLUTION INTRAVENOUS at 18:23

## 2023-01-01 RX ADMIN — NOREPINEPHRINE BITARTRATE 20 MCG/MIN: 1 SOLUTION INTRAVENOUS at 15:00

## 2023-01-01 RX ADMIN — NOREPINEPHRINE BITARTRATE 8 MCG: 1 INJECTION INTRAVENOUS at 17:27

## 2023-01-01 RX ADMIN — NOREPINEPHRINE BITARTRATE 8 MCG: 1 SOLUTION INTRAVENOUS at 18:44

## 2023-01-01 RX ADMIN — LINEZOLID 600 MG: 600 INJECTION, SOLUTION INTRAVENOUS at 04:49

## 2023-01-01 RX ADMIN — SODIUM CHLORIDE, SODIUM LACTATE, POTASSIUM CHLORIDE, AND CALCIUM CHLORIDE: .6; .31; .03; .02 INJECTION, SOLUTION INTRAVENOUS at 22:13

## 2023-01-01 RX ADMIN — ALBUMIN (HUMAN): 12.5 INJECTION, SOLUTION INTRAVENOUS at 22:16

## 2023-01-01 RX ADMIN — SODIUM CHLORIDE, SODIUM GLUCONATE, SODIUM ACETATE, POTASSIUM CHLORIDE AND MAGNESIUM CHLORIDE 500 ML: 526; 502; 368; 37; 30 INJECTION, SOLUTION INTRAVENOUS at 14:49

## 2023-01-01 RX ADMIN — CHLORHEXIDINE GLUCONATE 15 ML: 1.2 SOLUTION ORAL at 12:02

## 2023-01-01 RX ADMIN — VASOPRESSIN 0.04 UNITS/MIN: 20 INJECTION INTRAVENOUS at 01:30

## 2023-01-01 RX ADMIN — HYDROCORTISONE SODIUM SUCCINATE 50 MG: 100 INJECTION, POWDER, FOR SOLUTION INTRAMUSCULAR; INTRAVENOUS at 20:11

## 2023-01-01 RX ADMIN — Medication 50 MCG/HR: at 17:57

## 2023-01-01 RX ADMIN — CHLORHEXIDINE GLUCONATE 15 ML: 1.2 SOLUTION ORAL at 01:00

## 2023-01-01 RX ADMIN — FENTANYL CITRATE 50 MCG: 50 INJECTION INTRAMUSCULAR; INTRAVENOUS at 10:03

## 2023-01-01 RX ADMIN — SODIUM CHLORIDE, SODIUM GLUCONATE, SODIUM ACETATE, POTASSIUM CHLORIDE AND MAGNESIUM CHLORIDE 1500 ML: 526; 502; 368; 37; 30 INJECTION, SOLUTION INTRAVENOUS at 23:46

## 2023-01-01 RX ADMIN — EPINEPHRINE 5 MCG/MIN: 1 INJECTION INTRAMUSCULAR; INTRAVENOUS; SUBCUTANEOUS at 10:15

## 2023-01-01 RX ADMIN — MAGNESIUM SULFATE HEPTAHYDRATE 1 G: 1 INJECTION, SOLUTION INTRAVENOUS at 14:52

## 2023-01-01 RX ADMIN — HYDROCORTISONE SODIUM SUCCINATE 50 MG: 100 INJECTION, POWDER, FOR SOLUTION INTRAMUSCULAR; INTRAVENOUS at 11:02

## 2023-01-01 RX ADMIN — FENTANYL CITRATE 200 MCG: 50 INJECTION, SOLUTION INTRAMUSCULAR; INTRAVENOUS at 18:06

## 2023-01-01 RX ADMIN — ALBUMIN (HUMAN): 12.5 INJECTION, SOLUTION INTRAVENOUS at 17:09

## 2023-01-01 RX ADMIN — PHENYLEPHRINE HYDROCHLORIDE 75 MCG: 10 INJECTION INTRAVENOUS at 21:40

## 2023-01-01 RX ADMIN — Medication 50 MCG/HR: at 01:30

## 2023-01-01 RX ADMIN — SODIUM CHLORIDE, SODIUM GLUCONATE, SODIUM ACETATE, POTASSIUM CHLORIDE, MAGNESIUM CHLORIDE, SODIUM PHOSPHATE, DIBASIC, AND POTASSIUM PHOSPHATE 2500 ML: .53; .5; .37; .037; .03; .012; .00082 INJECTION, SOLUTION INTRAVENOUS at 13:53

## 2023-01-01 RX ADMIN — HYDROCORTISONE SODIUM SUCCINATE 100 MG: 100 INJECTION, POWDER, FOR SOLUTION INTRAMUSCULAR; INTRAVENOUS at 11:20

## 2023-01-01 RX ADMIN — METRONIDAZOLE 500 MG: 500 INJECTION, SOLUTION INTRAVENOUS at 00:15

## 2023-01-01 RX ADMIN — METRONIDAZOLE 500 MG: 500 INJECTION, SOLUTION INTRAVENOUS at 17:11

## 2023-01-01 RX ADMIN — SODIUM CHLORIDE, SODIUM GLUCONATE, SODIUM ACETATE, POTASSIUM CHLORIDE, MAGNESIUM CHLORIDE, SODIUM PHOSPHATE, DIBASIC, AND POTASSIUM PHOSPHATE 1000 ML: .53; .5; .37; .037; .03; .012; .00082 INJECTION, SOLUTION INTRAVENOUS at 01:10

## 2023-01-01 RX ADMIN — CEFEPIME 2000 MG: 2 INJECTION, POWDER, FOR SOLUTION INTRAVENOUS at 08:09

## 2023-01-01 RX ADMIN — ALBUMIN (HUMAN): 12.5 INJECTION, SOLUTION INTRAVENOUS at 19:29

## 2023-01-01 RX ADMIN — ALBUMIN (HUMAN): 12.5 INJECTION, SOLUTION INTRAVENOUS at 21:51

## 2023-01-01 RX ADMIN — CHLORHEXIDINE GLUCONATE 15 ML: 1.2 SOLUTION ORAL at 20:10

## 2023-01-01 RX ADMIN — METRONIDAZOLE 500 MG: 500 INJECTION, SOLUTION INTRAVENOUS at 03:12

## 2023-01-01 RX ADMIN — FENTANYL CITRATE 50 MCG: 50 INJECTION INTRAMUSCULAR; INTRAVENOUS at 18:39

## 2023-01-01 RX ADMIN — CALCIUM CHLORIDE 1 G: 100 INJECTION PARENTERAL at 20:15

## 2023-01-01 RX ADMIN — SODIUM CHLORIDE, SODIUM LACTATE, POTASSIUM CHLORIDE, AND CALCIUM CHLORIDE: .6; .31; .03; .02 INJECTION, SOLUTION INTRAVENOUS at 19:25

## 2023-01-01 RX ADMIN — CEFEPIME 2000 MG: 2 INJECTION, POWDER, FOR SOLUTION INTRAVENOUS at 11:00

## 2023-01-01 RX ADMIN — MIDAZOLAM 2 MG: 1 INJECTION INTRAMUSCULAR; INTRAVENOUS at 20:33

## 2023-01-01 RX ADMIN — HEPARIN SODIUM 5000 UNITS: 5000 INJECTION INTRAVENOUS; SUBCUTANEOUS at 01:34

## 2023-01-01 RX ADMIN — SODIUM BICARBONATE 25 MEQ: 84 INJECTION, SOLUTION INTRAVENOUS at 09:03

## 2023-01-01 RX ADMIN — METRONIDAZOLE 500 MG: 500 INJECTION, SOLUTION INTRAVENOUS at 11:00

## 2023-01-01 RX ADMIN — HEPARIN SODIUM 5000 UNITS: 5000 INJECTION INTRAVENOUS; SUBCUTANEOUS at 14:13

## 2023-09-14 NOTE — ED PROVIDER NOTES
History  Chief Complaint   Patient presents with   • Rectal Bleeding     Patient states he started with rectal bleeding, nausea and vomiting a week ago. Patient denies any pain anywhere. Patient drinks 8-10 cans of beer a day. States he only drank 1 today. Also complains of weakness     59 yo male presents by EMS with concern for rectal bleeding he states he had nausea vomiting and diarrhea last week and called the ambulance for feeling ill. Does not have a regular doctor and has not had any medical care for the last 25 years. All signs in route were remarkable for hypotension with blood pressures of 88/58. accucheck in the 190s  Patient denies any trauma or falls he does have a mild headache which is diffuse he last vomited 2 days ago he he had 1 loose stool today which was dark feels his diarrhea is improving. Denies any neck pain chest pain shortness of breath no upper back pain he does have lower back pain denies any abdominal pain currently has no nausea or vomiting his appetite is diminished he denies fever or chills he had no cough or upper respiratory complaints and or shortness he has been lightheaded he does complain of myalgias. He urinated once today so far no urinary complaints  He does drink 8-10 beers per day he was only able to have 1 beer today occasionally smokes denies any other drug use; 1 at the house has been ill. He denies prior history of getting the shakes if he stops drinking no prior history of DTs or withdrawal seizures; Medical history unremarkable past surgical history none no prior EGD or colonoscopies medications none allergies no known drug allergies          None       History reviewed. No pertinent past medical history. History reviewed. No pertinent surgical history. History reviewed. No pertinent family history. I have reviewed and agree with the history as documented.     E-Cigarette/Vaping     E-Cigarette/Vaping Substances     Social History     Tobacco Use   • Smoking status: Never   • Smokeless tobacco: Never   Substance Use Topics   • Alcohol use: Yes     Comment: 8-10 can of beers a day   • Drug use: Not Currently       Review of Systems   Constitutional: Positive for activity change and appetite change. Negative for chills, diaphoresis and fever. HENT: Negative for congestion, ear pain, rhinorrhea, sneezing and sore throat. Eyes: Negative for discharge. Respiratory: Negative for cough and shortness of breath. Cardiovascular: Negative for chest pain and leg swelling. Gastrointestinal: Positive for diarrhea, nausea and vomiting. Negative for abdominal pain and blood in stool. Endocrine: Negative for polyuria. Genitourinary: Positive for decreased urine volume. Negative for difficulty urinating, dysuria, frequency and urgency. Musculoskeletal: Positive for back pain (low back pain). Negative for myalgias. Skin: Negative for rash. Neurological: Positive for weakness, light-headedness and headaches. Negative for dizziness and numbness. Hematological: Negative for adenopathy. Psychiatric/Behavioral: Negative for confusion. All other systems reviewed and are negative. Physical Exam  Physical Exam  Vitals and nursing note reviewed. Exam conducted with a chaperone present. Constitutional:       General: He is not in acute distress. Appearance: He is ill-appearing. He is not toxic-appearing or diaphoretic. HENT:      Head: Normocephalic. Right Ear: Tympanic membrane and external ear normal.      Left Ear: Tympanic membrane and external ear normal.      Nose: Nose normal. No congestion or rhinorrhea. Mouth/Throat:      Mouth: Mucous membranes are dry. Comments: Whitish coating per patient from tooth paste poor dentition  Eyes:      General: No scleral icterus. Right eye: No discharge. Left eye: No discharge. Extraocular Movements: Extraocular movements intact.       Conjunctiva/sclera: Conjunctivae normal.      Pupils: Pupils are equal, round, and reactive to light. Cardiovascular:      Rate and Rhythm: Tachycardia present. Heart sounds: No murmur heard. Comments:   Pulmonary:      Effort: Pulmonary effort is normal. No respiratory distress. Breath sounds: Normal breath sounds. No wheezing, rhonchi or rales. Chest:      Chest wall: No tenderness. Abdominal:      General: Bowel sounds are normal. There is no distension. Tenderness: There is no abdominal tenderness. There is no right CVA tenderness, left CVA tenderness or guarding. Genitourinary:     Penis: Normal.       Rectum: Guaiac result negative. Comments: chaparoned by Jasmine Shepherd RN rectal exam normal tone prostate is nontender full extent not palpated brown stool on the glove heme-negative controls intact  Musculoskeletal:         General: Normal range of motion. Cervical back: Normal range of motion and neck supple. Right lower leg: No edema. Left lower leg: No edema. Skin:     General: Skin is dry. Capillary Refill: Capillary refill takes less than 2 seconds. Comments: Dried excrement brownish down legs and feet   Neurological:      General: No focal deficit present. Mental Status: He is alert and oriented to person, place, and time. Cranial Nerves: No cranial nerve deficit. Sensory: No sensory deficit. Motor: No weakness.       Coordination: Coordination normal.      Deep Tendon Reflexes: Reflexes normal.      Comments: GCS 15   Psychiatric:         Mood and Affect: Mood normal.         Vital Signs  ED Triage Vitals   Temperature Pulse Respirations Blood Pressure SpO2   09/14/23 1313 09/14/23 1245 09/14/23 1245 09/14/23 1245 09/14/23 1245   (!) 97.2 °F (36.2 °C) 102 18 (!) 89/54 97 %      Temp Source Heart Rate Source Patient Position - Orthostatic VS BP Location FiO2 (%)   09/14/23 1313 09/14/23 1245 -- -- --   Oral Monitor         Pain Score       09/14/23 1245       No Pain           Vitals:    09/14/23 1630 09/14/23 1645 09/14/23 1700 09/14/23 1715   BP: 94/53 96/55 98/57 92/55   Pulse: 98 100 100 99         Visual Acuity      ED Medications  Medications   thiamine (VITAMIN B1) 200 mg in sodium chloride 0.9 % 50 mL IVPB (0 mg Intravenous Stopped 9/14/23 1418)   multi-electrolyte (ISOLYTE-S PH 7.4) bolus 2,500 mL (0 mL Intravenous Stopped 9/14/23 1453)   magnesium sulfate IVPB (premix) SOLN 1 g (0 g Intravenous Stopped 9/14/23 1552)   potassium chloride 20 mEq IVPB (premix) (20 mEq Intravenous New Bag 9/14/23 1623)   vancomycin (VANCOCIN) 1000 mg in sodium chloride 0.9% 250 mL IVPB (1,000 mg Intravenous Given 9/14/23 1627)   cefepime (MAXIPIME) IVPB (premix in dextrose) 2,000 mg 50 mL (0 mg Intravenous Stopped 9/14/23 1524)   dextrose 5 % and sodium chloride 0.9 % infusion (0 mL/hr Intravenous Stopped 9/14/23 1720)   potassium chloride (K-DUR,KLOR-CON) CR tablet 40 mEq (40 mEq Oral Given 9/14/23 1712)   potassium chloride 20 mEq IVPB (premix) ( Intravenous Stopped 9/14/23 2010)       Diagnostic Studies  Results Reviewed     Procedure Component Value Units Date/Time    Blood culture #1 [[id number]] Collected: 09/14/23 1330    Lab Status: Preliminary result Specimen: Blood from Arm, Left Updated: 09/14/23 2203     Blood Culture Received in Microbiology Lab. Culture in Progress. Blood culture #2 [[id number]] Collected: 09/14/23 1330    Lab Status: Preliminary result Specimen: Blood from Arm, Right Updated: 09/14/23 2203     Blood Culture Received in Microbiology Lab. Culture in Progress. Lactic acid 2 Hours [[id number]]  (Abnormal) Collected: 09/14/23 1607    Lab Status: Final result Specimen: Blood from Arm, Right Updated: 09/14/23 1710     LACTIC ACID 4.1 mmol/L     Narrative:      Result may be elevated if tourniquet was used during collection.     HS Troponin I 2hr [[id number]]  (Normal) Collected: 09/14/23 9326    Lab Status: Final result Specimen: Blood from Arm, Right Updated: 09/14/23 1640     hs TnI 2hr 15 ng/L      Delta 2hr hsTnI -2 ng/L     HS Troponin I 4hr [[id number]]     Lab Status: No result Specimen: Blood     RBC Morphology Reflex Test [[id number]] Collected: 09/14/23 1330    Lab Status: Final result Specimen: Blood from Arm, Right Updated: 09/14/23 1501    Lactic acid, plasma (w/reflex if result > 2.0) [[id number]]  (Abnormal) Collected: 09/14/23 1330    Lab Status: Final result Specimen: Blood from Arm, Right Updated: 09/14/23 1427     LACTIC ACID 6.3 mmol/L     Narrative:      Result may be elevated if tourniquet was used during collection.     Basic metabolic panel [[id number]]  (Abnormal) Collected: 09/14/23 1330    Lab Status: Final result Specimen: Blood from Arm, Right Updated: 09/14/23 1427     Sodium 127 mmol/L      Potassium 2.2 mmol/L      Chloride 81 mmol/L      CO2 21 mmol/L      ANION GAP 25 mmol/L      BUN 62 mg/dL      Creatinine 3.09 mg/dL      Glucose 79 mg/dL      Calcium 7.0 mg/dL      eGFR 20 ml/min/1.73sq m     Narrative:      [place] guidelines for Chronic Kidney Disease (CKD):   •  Stage 1 with normal or high GFR (GFR > 90 mL/min/1.73 square meters)  •  Stage 2 Mild CKD (GFR = 60-89 mL/min/1.73 square meters)  •  Stage 3A Moderate CKD (GFR = 45-59 mL/min/1.73 square meters)  •  Stage 3B Moderate CKD (GFR = 30-44 mL/min/1.73 square meters)  •  Stage 4 Severe CKD (GFR = 15-29 mL/min/1.73 square meters)  •  Stage 5 End Stage CKD (GFR <15 mL/min/1.73 square meters)  Note: GFR calculation is accurate only with a steady state creatinine    CBC and differential [[id number]]  (Abnormal) Collected: 09/14/23 1330    Lab Status: Final result Specimen: Blood from Arm, Right Updated: 09/14/23 1425     WBC 13.00 Thousand/uL      RBC 2.96 Million/uL      Hemoglobin 11.4 g/dL      Hematocrit 30.7 %       fL      MCH 38.5 pg      MCHC 37.1 g/dL      RDW 14.4 %      MPV 12.4 fL      Platelets 52 Thousands/uL     Manual Differential(PHLEBS Do Not Order) [[id number]]  (Abnormal) Collected: 09/14/23 1330    Lab Status: Final result Specimen: Blood from Arm, Right Updated: 09/14/23 1425     Segmented % 78 %      Bands % 10 %      Lymphocytes % 7 %      Monocytes % 5 %      Eosinophils, % 0 %      Basophils % 0 %      Absolute Neutrophils 11.44 Thousand/uL      Lymphocytes Absolute 0.91 Thousand/uL      Monocytes Absolute 0.65 Thousand/uL      Eosinophils Absolute 0.00 Thousand/uL      Basophils Absolute 0.00 Thousand/uL      Total Counted --     RBC Morphology Present     Platelet Estimate Decreased     Large Platelet Present     Hypochromia Present    TSH, 3rd generation with Free T4 reflex [[id number]]  (Normal) Collected: 09/14/23 1330    Lab Status: Final result Specimen: Blood from Arm, Right Updated: 09/14/23 1421     TSH 3RD GENERATON 3.621 uIU/mL     FLU/RSV/COVID - if FLU/RSV clinically relevant [[id number]]  (Normal) Collected: 09/14/23 1330    Lab Status: Final result Specimen: Nares from Nose Updated: 09/14/23 1421     SARS-CoV-2 Negative     INFLUENZA A PCR Negative     INFLUENZA B PCR Negative     RSV PCR Negative    Narrative:      FOR PEDIATRIC PATIENTS - copy/paste COVID Guidelines URL to browser: [email or web address]    SARS-CoV-2 assay is a Nucleic Acid Amplification assay intended for the  qualitative detection of nucleic acid from SARS-CoV-2 in nasopharyngeal  swabs. Results are for the presumptive identification of SARS-CoV-2 RNA. Positive results are indicative of infection with SARS-CoV-2, the virus  causing COVID-19, but do not rule out bacterial infection or co-infection  with other viruses. Laboratories within the [facility] and its  territories are required to report all positive results to the appropriate  public health authorities.  Negative results do not preclude SARS-CoV-2  infection and should not be used as the sole basis for treatment or other  patient management decisions. Negative results must be combined with  clinical observations, patient history, and epidemiological information. This test has not been FDA cleared or approved. This test has been authorized by FDA under an Emergency Use Authorization  (EUA). This test is only authorized for the duration of time the  declaration that circumstances exist justifying the authorization of the  emergency use of an in vitro diagnostic tests for detection of SARS-CoV-2  virus and/or diagnosis of COVID-19 infection under section 564(b)(1) of  the Act, 21 U. S.C. 462TET-2(Q)(4), unless the authorization is terminated  or revoked sooner. The test has been validated but independent review by FDA  and CLIA is pending. Test performed using [employer]: This RT-PCR assay targets N2,  a region unique to SARS-CoV-2. A conserved region in the E-gene was chosen  for pan-Sarbecovirus detection which includes SARS-CoV-2. According to CMS-2020-01-R, this platform meets the definition of high-throughput technology.     Procalcitonin [[id number]]  (Abnormal) Collected: 09/14/23 1330    Lab Status: Final result Specimen: Blood from Arm, Right Updated: 09/14/23 1414     Procalcitonin 24.18 ng/ml     Hepatic function panel [[id number]]  (Abnormal) Collected: 09/14/23 1330    Lab Status: Final result Specimen: Blood from Arm, Right Updated: 09/14/23 1410     Total Bilirubin 3.30 mg/dL      Bilirubin, Direct 1.93 mg/dL      Alkaline Phosphatase 179 U/L      AST 43 U/L      ALT 16 U/L      Total Protein 4.6 g/dL      Albumin 2.1 g/dL     Magnesium [[id number]]  (Abnormal) Collected: 09/14/23 1330    Lab Status: Final result Specimen: Blood from Arm, Right Updated: 09/14/23 1410     Magnesium 1.7 mg/dL     Lipase [[id number]]  (Abnormal) Collected: 09/14/23 1330    Lab Status: Final result Specimen: Blood from Arm, Right Updated: 09/14/23 1410     Lipase <6 u/L     CK [[id number]]  (Normal) Collected: 09/14/23 1330 Lab Status: Final result Specimen: Blood from Arm, Right Updated: 09/14/23 1410     Total  U/L     Salicylate level [[id number]]  (Normal) Collected: 09/14/23 1330    Lab Status: Final result Specimen: Blood from Arm, Right Updated: 35/09/90 6982     Salicylate Lvl <5 mg/dL     Acetaminophen level-If concentration is detectable, please discuss with medical  on call.  [[id number]]  (Abnormal) Collected: 09/14/23 1330    Lab Status: Final result Specimen: Blood from Arm, Right Updated: 09/14/23 1410     Acetaminophen Level <10 ug/mL     HS Troponin 0hr (reflex protocol) [[id number]]  (Normal) Collected: 09/14/23 1330    Lab Status: Final result Specimen: Blood from Arm, Right Updated: 09/14/23 1409     hs TnI 0hr 17 ng/L     Ethanol [[id number]]  (Normal) Collected: 09/14/23 1330    Lab Status: Final result Specimen: Blood from Arm, Right Updated: 09/14/23 1400     Ethanol Lvl <10 mg/dL     Ammonia [[id number]]  (Normal) Collected: 09/14/23 1330    Lab Status: Final result Specimen: Blood from Arm, Right Updated: 09/14/23 1359     Ammonia 62 umol/L     Protime-INR [[id number]]  (Abnormal) Collected: 09/14/23 1330    Lab Status: Final result Specimen: Blood from Arm, Right Updated: 09/14/23 1356     Protime 17.1 seconds      INR 1.41    APTT [[id number]]  (Normal) Collected: 09/14/23 1330    Lab Status: Final result Specimen: Blood from Arm, Right Updated: 09/14/23 1356     PTT 35 seconds     Beta Hydroxybutyrate [[id number]]  (Abnormal) Collected: 09/14/23 1330    Lab Status: Final result Specimen: Blood from Arm, Right Updated: 09/14/23 1351     BETA-HYDROXYBUTYRATE 0.8 mmol/L     Blood gas, venous [[id number]]  (Abnormal) Collected: 09/14/23 1330    Lab Status: Final result Specimen: Blood from Arm, Right Updated: 09/14/23 1346     pH, Rocky 7.416     pCO2, Rocky 35.9 mm Hg      pO2, Rocky 39.6 mm Hg      HCO3, Rocky 22.6 mmol/L      Base Excess, Rocky -1.5 mmol/L      O2 Content, Rocky 12.3 ml/dL      O2 HGB, VENOUS 70.8 %     UA w Reflex to Microscopic w Reflex to Culture [[id number]]     Lab Status: No result Specimen: Urine                  CT head without contrast   Final Result by Sammi Bah DO (09/14 1530)   No acute intracranial abnormality. Workstation performed: [id number]         CT chest abdomen pelvis wo contrast   Final Result by Gasper Severin, MD (09/14 1607)      1. Findings concerning for perforated acute sigmoid diverticulitis complicated by fistulization to the inner abdominal wall where there is a large contained collection collection of fluid and gas and additional collection/fistula bridging the sigmoid    colon and bladder dome. 2.  Small multifocal groundglass opacities suggests concurrent pneumonia. 3.  Circumferential esophageal wall thickening suggests esophagitis. This may be correlated with endoscopy. 4.  Hepatosplenomegaly and severe hepatic steatosis. 5.  Cholelithiasis. I personally discussed this study with Luna Edwards on 9/14/2023 4:05 PM.            Workstation performed: [id number]         XR chest 1 view portable   ED Interpretation by Luna Edwards MD (09/14 2565)   Per my independent interpretation. Radiologist to provide formal read. No acute process      Final Result by Artem Beaulieu MD (09/14 1525)      No acute cardiopulmonary disease. Workstation performed: [id number]         XR chest portable    (Results Pending)              Procedures  ECG 12 Lead Documentation Only    Date/Time: 9/14/2023 1:29 PM    Performed by: Luna Edwards MD  Authorized by: Luna Edwards MD    Indications / Diagnosis:  Weakness  ECG reviewed by me, the ED Provider: yes    Patient location:  ED  Previous ECG:     Previous ECG comparison: no previous avail.   Rate:     ECG rate:  100    ECG rate assessment: tachycardic    Rhythm:     Rhythm: sinus tachycardia    QRS:     QRS axis:  Left  Comments:      ; no acute ischemic changes    CriticalCare Time    Date/Time: 9/14/2023 4:58 PM    Performed by: Juan Lei MD  Authorized by: Juan Lei MD    Critical care provider statement:     Critical care time (minutes):  90    Critical care time was exclusive of:  Separately billable procedures and treating other patients and teaching time    Critical care was necessary to treat or prevent imminent or life-threatening deterioration of the following conditions:  Sepsis, renal failure, endocrine crisis and metabolic crisis    Critical care was time spent personally by me on the following activities:  Obtaining history from patient or surrogate, development of treatment plan with patient or surrogate, discussions with consultants, evaluation of patient's response to treatment, examination of patient, ordering and performing treatments and interventions, ordering and review of laboratory studies, ordering and review of radiographic studies, re-evaluation of patient's condition and review of old charts    I assumed direction of critical care for this patient from another provider in my specialty: no               ED Course  ED Course as of 09/14/23 2251   u Sep 14, 2023   1435 Secondary to bandemia and elevated procal and lactic acid will initate emperic antibiotcs cefepime and vanco   1451 Will supplement magnesium and KCL 20meq until patient has not yet urinated will then supplement futher   1547 BP responding to IVF   [address] Daina Gonzalez here to evaluate the patient.  Prefers to place central line in the OR    1657 Blood consent signed and on chart             HEART Risk Score    Flowsheet Row Most Recent Value   Heart Score Risk Calculator    History 1 Filed at: 09/14/2023 2251   ECG 1 Filed at: 09/14/2023 2251   Age 1 Filed at: 09/14/2023 2251   Risk Factors 1 Filed at: 09/14/2023 2251   Troponin 1 Filed at: 09/14/2023 2251   HEART Score 5 Filed at: 09/14/2023 2251 Medical Decision Making  Mdm; 69-year-old male has not been in contact with healthcare for the last 25 years by history alcohol use disorder typically drinks 8-10 beers per day presents with 1 week history of vomiting and diarrhea. He is clinically demonstrating signs of severe hypovolemia. And is at risk for electrolyte abnormality acute coronary syndrome hepatic kidney injury pancreatitis; patient is guaiac negative no evidence of GI bleed. Will initiate IV fluid resuscitation at 30ml/kg evaluate for acid-base disturbance, colitis, infection    Amount and/or Complexity of Data Reviewed  Labs: ordered. Radiology: ordered. Risk  Prescription drug management.           Disposition  Final diagnoses:   Perforated abdominal viscus - perforated acute sigmoid diverticulitis complicated by fistulization to the inner abdominal wall where there is a large contained collection collection of fluid and gas and additional collection/fistula bridging the sigmoid    Sepsis ([address])   Lactic acidosis   Thrombocytopenia (HCC)   SHANIQUA (acute kidney injury) ([address])   Low magnesium level   Low blood potassium   Anemia   Pneumonia - seen on CT   Alcohol use disorder   Hyponatremia     Time reflects when diagnosis was documented in both MDM as applicable and the Disposition within this note     Time User Action Codes Description Comment    9/14/2023  4:48 PM Nicky Davis Add [K57.20] Diverticulitis of large intestine with perforation and abscess without bleeding     9/14/2023  4:52 PM Jose J Jaramillo Add [R19.8] Perforated abdominal viscus     9/14/2023  4:53 PM Jose J Jaramillo Modify [R19.8] Perforated abdominal viscus perforated acute sigmoid diverticulitis complicated by fistulization to the inner abdominal wall where there is a large contained collection collection of fluid and gas and additional collection/fistula bridging the sigmoid     9/14/2023  4:53 PM Brittni Spicer [A41.9] Sepsis ([address])     9/14/2023  4:53 PM Silvano Ganja Add [E87.20] Lactic acidosis     9/14/2023  4:53 PM Silvano Ganja Add [D69.6] Thrombocytopenia ([address])     9/14/2023  4:53 PM Romanenko, Celesta Glassing Add [N17.9] SHANIQUA (acute kidney injury) ([address])     9/14/2023  4:54 PM Romanenko, Celesta Glassing Add [R79.0] Low magnesium level     9/14/2023  4:54 PM Romanenko, Celesta Glassing Add [E87.6] Low blood potassium     9/14/2023  4:54 PM Silvano Ganja Add [D64.9] Anemia     9/14/2023  4:54 PM Silvano Ganja Add [J18.9] Pneumonia     9/14/2023  4:55 PM Silvano Ganja Modify [J18.9] Pneumonia seen on CT    9/14/2023  4:56 PM Romanenko, Celesta Glassing Add [F10.90] Alcohol use disorder     9/14/2023  5:20 PM Silvano Ganja Add [E87.1] Hyponatremia     9/14/2023  7:20 PM Leopoldo Everts Modify [K57.20] Diverticulitis of large intestine with perforation and abscess without bleeding       ED Disposition     ED Disposition   Admit    Condition   Critical    Date/Time   Thu Sep 14, 2023  4:47 PM    Comment   Case was discussed with Dr. Ravin Hastings at [address] [place] [address]    None         There are no discharge medications for this patient. No discharge procedures on file.     PDMP Review     None          ED Provider  Electronically Signed by           Lamar Arana MD  09/14/23 1780

## 2023-09-14 NOTE — H&P
H&P Exam - General Surgery   Libertad Christie 60 y.o. male MRN: [id number]  Unit/Bed#: AN63 Encounter: [id number]    Assessment/Plan     Assessment:  59 yo M with history of alcohol use who presents with septic shock and multisystem organ dysfunction 2/2 perforated sigmoid diverticulitis    Initially hypotensive 80's/50s, tachycardic 100's, other vitals normal on room air    WBC 13.00  Hb 11.4  Cr 3.09  T bili 3.30  AST/ALT 43/16    LA 6.3    9/14/23 CT chest abdomen pelvis wo contrast:  Findings concerning for perforated acute sigmoid diverticulitis complicated by fistulization to the inner abdominal wall where there is a large contained collection collection of fluid and gas and additional collection/fistula bridging the sigmoid colon and bladder dome. Small multifocal groundglass opacities suggests concurrent pneumonia. Plan:  Plan for emergent OR for exploratory laparotomy, washout, colectomy, possible ostomy, possible abthera placement  NPO for procedure  IVF, trend lactate and resuscitate PRN  Replete electrolytes  Type and Cross 2 units  Will plan for postoperative transfer to [facility] for ongoing ICU care    History of Present Illness     HPI:  Libertad Christie is a 60 y.o. male who presents with abdominal pain, nausea, vomiting, and diarrhea. Patient has no past medical or surgical history but reports that he drinks alcohol. He states his abdominal pain is located on the left side of his abdomen and started with mild pain approximately one month. It has gradually worsened over that time frame and has gotten more severe over the last week. Over the last week he has also had associated nausea, vomiting and profuse diarrhea. On presentation, he was found to have leucocytosis, SHANIQUA, elevated LFT's, and lactic acidosis. CT imaging showed findings concerning for perforated sigmoid diverticulitis. General surgery consulted for perforated sigmoid diverticulitis.     Review of Systems   Constitutional: Negative. HENT: Negative. Eyes: Negative. Respiratory: Negative. Cardiovascular: Negative. Gastrointestinal: Positive for abdominal pain, diarrhea, nausea and vomiting. Endocrine: Negative. Genitourinary: Negative. Musculoskeletal: Positive for back pain. Skin: Negative. Neurological: Negative. Psychiatric/Behavioral: Negative. Historical Information   History reviewed. No pertinent past medical history. History reviewed. No pertinent surgical history. Social History   Social History     Substance and Sexual Activity   Alcohol Use Yes    Comment: 8-10 can of beers a day     Social History     Substance and Sexual Activity   Drug Use Not Currently     Social History     Tobacco Use   Smoking Status Never   Smokeless Tobacco Never     E-Cigarette/Vaping     E-Cigarette/Vaping Substances     Family History: History reviewed. No pertinent family history. Meds/Allergies   PTA meds:   None     No Known Allergies    Objective   First Vitals:   Blood Pressure: (!) 89/54 (09/14/23 1245)  Pulse: 102 (09/14/23 1245)  Temperature: (!) 97.2 °F (36.2 °C) (09/14/23 1313)  Temp Source: Oral (09/14/23 1313)  Respirations: 18 (09/14/23 1245)  Weight - Scale: 74 kg (163 lb 2.3 oz) (09/14/23 1245)  SpO2: 97 % (09/14/23 1245)    Current Vitals:   Blood Pressure: 96/55 (09/14/23 1645)  Pulse: 100 (09/14/23 1645)  Temperature: (!) 97.2 °F (36.2 °C) (09/14/23 1313)  Temp Source: Oral (09/14/23 1313)  Respirations: 21 (09/14/23 1645)  Weight - Scale: 74 kg (163 lb 2.3 oz) (09/14/23 1245)  SpO2: 97 % (09/14/23 1645)    No intake or output data in the 24 hours ending 09/14/23 1656    Invasive Devices     Peripheral Intravenous Line  Duration           Peripheral IV 09/14/23 Left Hand <1 day    Peripheral IV 09/14/23 Right Forearm <1 day                Physical Exam  Constitutional:       Appearance: Normal appearance. HENT:      Head: Normocephalic and atraumatic.       Right Ear: External ear normal.      Left Ear: External ear normal.      Nose: Nose normal.      Mouth/Throat:      Mouth: Mucous membranes are moist.      Pharynx: Oropharynx is clear. Eyes:      Extraocular Movements: Extraocular movements intact. Conjunctiva/sclera: Conjunctivae normal.      Pupils: Pupils are equal, round, and reactive to light. Cardiovascular:      Rate and Rhythm: Regular rhythm. Tachycardia present. Pulses: Normal pulses. Pulmonary:      Effort: Pulmonary effort is normal.   Abdominal:      General: Abdomen is flat. There is distension. Palpations: Abdomen is soft. Tenderness: There is abdominal tenderness (Mildly tender in LLQ). Musculoskeletal:         General: Normal range of motion. Cervical back: Normal range of motion. Skin:     General: Skin is warm and dry. Neurological:      General: No focal deficit present. Mental Status: He is alert and oriented to person, place, and time. Psychiatric:         Mood and Affect: Mood normal.         Behavior: Behavior normal.         Lab Results: I have personally reviewed pertinent lab results. Imaging: I have personally reviewed pertinent reports. EKG, Pathology, and Other Studies: I have personally reviewed pertinent reports.       Code Status: No Order  Advance Directive and Living Will:      Power of :    POLST:

## 2023-09-14 NOTE — PROCEDURES
Central Line Insertion    Date/Time: 9/14/2023 5:50 PM    Performed by: Beverley Mckinnon MD  Authorized by: Beverley Mckinnon MD    Patient location:  ED  Other Assisting Provider: No    Consent:     Consent obtained:  Verbal and written    Consent given by:  Patient    Risks discussed:  Arterial puncture, incorrect placement, nerve damage, pneumothorax, infection and bleeding  Universal protocol:     Patient identity confirmed:  Verbally with patient and hospital-assigned identification number  Pre-procedure details:     Hand hygiene: Hand hygiene performed prior to insertion      Sterile barrier technique: All elements of maximal sterile technique followed      Skin preparation:  ChloraPrep    Skin preparation agent: Skin preparation agent completely dried prior to procedure    Indications:     Central line indications: medications requiring central line    Anesthesia (see MAR for exact dosages): Anesthesia method:  Local infiltration    Local anesthetic:  Lidocaine 1% w/o epi  Procedure details:     Location:  Right internal jugular    Vessel type: vein      Laterality:  Right    Approach: percutaneous technique used      Patient position:  Trendelenburg    Catheter type:  Triple lumen 16cm    Catheter size:  7 Fr    Landmarks identified: yes      Ultrasound guidance: yes      Ultrasound image availability:  Not saved    Sterile ultrasound techniques: Sterile gel and sterile probe covers were used      Manometry confirmation: yes      Number of attempts:  1    Successful placement: yes      Vessel of catheter tip end:  SVC  Post-procedure details:     Post-procedure:  Dressing applied and line sutured    Assessment:  Blood return through all ports, no pneumothorax on x-ray, placement verified by x-ray and free fluid flow    Patient tolerance of procedure:   Tolerated well, no immediate complications

## 2023-09-14 NOTE — ANESTHESIA PROCEDURE NOTES
Arterial Line Insertion    Performed by: Kylee Palmer MD  Authorized by: Kylee Palmer MD  Patient identity confirmed: verbally with patient, arm band, provided demographic data, hospital-assigned identification number and anonymous protocol, patient vented/unresponsive  Time out: Immediately prior to procedure a "time out" was called to verify the correct patient, procedure, equipment, support staff and site/side marked as required. Preparation: Patient was prepped and draped in the usual sterile fashion.   Indications: respiratory failure and hemodynamic monitoring  Orientation:  Left  Location: radial artery  Sedation:  Patient sedated: yes    Procedure Details:  Pablo's test normal: yes  Needle gauge: 20  Seldinger technique: Seldinger technique used  Number of attempts: 1    Post-procedure:  Post-procedure: dressing applied  Waveform: good waveform  Post-procedure CNS: normal and unchanged

## 2023-09-14 NOTE — ANESTHESIA PREPROCEDURE EVALUATION
Procedure:  LAPAROTOMY EXPLORATORY, colectomy, possible ostomy, possible abthera placement (Abdomen)    Relevant Problems   No relevant active problems        Physical Exam    Airway    Mallampati score: III  TM Distance: >3 FB  Neck ROM: full     Dental       Cardiovascular      Pulmonary      Other Findings  Poor dentition, multiple missing, none loose per pt      Anesthesia Plan  ASA Score- 4 Emergent    Anesthesia Type- general with ASA Monitors. Additional Monitors: arterial line and central venous line. Airway Plan: ETT. Plan Factors-Exercise tolerance (METS): >4 METS. Chart reviewed. EKG reviewed. Existing labs reviewed. Patient summary reviewed. Patient is not a current smoker. There is medical exclusion for perioperative obstructive sleep apnea risk education. Induction- intravenous and rapid sequence induction. Postoperative Plan-     Informed Consent- Anesthetic plan and risks discussed with patient. I personally reviewed this patient with the CRNA. Discussed and agreed on the Anesthesia Plan with the CRNA. Shilo Saab

## 2023-09-15 PROBLEM — K63.1 PERFORATED SIGMOID COLON (HCC): Status: ACTIVE | Noted: 2023-01-01

## 2023-09-15 PROBLEM — M79.89 NECROTIZING SOFT TISSUE INFECTION: Status: ACTIVE | Noted: 2023-01-01

## 2023-09-15 NOTE — CONSULTS
Consultation - General Surgery   Mag Guevara 60 y.o. male MRN: [id number]  Unit/Bed#: MICU 04 Encounter: [id number]    Assessment/Plan     Assessment:  55yoM p/w perforated sigmoid diverticulitis c/b fistulization to the intra-abdominal wall w large contained collection of fluid and gas c/f NSTI, s/p:  -Exploratory laparotomy, lysis of adhesions, sigmoidectomy, partial omentectomy, abdominal wall excisional debridement and drainage of abscess, AB[facility]ra VAC at [facility]  -Reexploration exploratory laparotomy, partial resection of descending colon, partial omentectomy, excisional debridement of abdominal wall, drainage of abdominal abscess collection and peritoneal lavage, washout, ABThera VAC at [facility]    9/14 CT C/A/P: Findings concerning for perforated acute sigmoid diverticulitis complicated by fistulization to the abdominal wall with large contained collection of fluid and gas and additional collection/fistula bridging to the sigmoid colon and bladder dome, small multifocal groundglass opacities, possible esophagitis, hepatosplenomegaly and severe hepatic steatosis, cholelithiasis    Vent SIMV 16/500/6/50, levo at 6, Zackary at 75    WBC 8.31 from 11.37 from 13  Hemoglobin 7.1 from 8.4 after 1 unit PRBC in the OR  Platelets 82 from 48 after a unit of platelets  BMP pending, was hyponatremic hypokalemic with SHANIQUA  Blood cultures pending  Lactate pending, last 4.1 from 6.3        Plan:  -Intubated and sedated  -Wean pressors as tolerated  -Plan for takeback to the OR for reexploration today 9/15  -Follow lactate, labs  -Cefepime/Flagyl/vancomycin/clinda  -SQH  -Pain control  -IVF resuscitation  -Appreciate ICU care    History of Present Illness   HPI:  Mag Guevara is a 60 y.o. male w unremarkable past medical and surgical history, presenting to [facility] by [facility]/14 reporting left-sided abdominal pain and associated nausea and emesis, diarrhea, symptoms duration over approximately a month but acutely worsening over the last week. On presentation he was found to have leukocytosis and lactic acidosis, SHANIQUA, severe electrolyte abnormalities. He was found on imaging to have perforated sigmoid diverticulitis and fistulization to the intra-abdominal wall with a large contained collection of fluid and gas concerning for NSTI. He was taken emergently to the OR at [facility] and underwent exploratory laparotomy, lysis of adhesions, sigmoid resection, partial omentectomy, extensive debridement of abdominal wall and drainage of abscess collections, VAC placement. Patient was then sent priority to [facility] for further work-up and management including serial debridements and OR as well as admission to the surgical ICU. On arrival to [facility] he was then again taken emergently to the OR for reexploration exploratory laparotomy, partial left colectomy descending colon, debridement of abdominal wall down to fascia and muscle, partial omentectomy, drainage of abdominal collections and abscesses, washout, ABThera VAC placement and return to the ICU. Patient intubated and sedated on return to the ICU, on vasopressor medication. Consults    Review of Systems   Unable to perform ROS: Intubated       Historical Information   No past medical history on file. No past surgical history on file.   Social History   Social History     Substance and Sexual Activity   Alcohol Use Yes    Comment: 8-10 can of beers a day     Social History     Substance and Sexual Activity   Drug Use Not Currently     E-Cigarette/Vaping     E-Cigarette/Vaping Substances     Social History     Tobacco Use   Smoking Status Never   Smokeless Tobacco Never     Family History: non-contributory    Meds/Allergies   all current active meds have been reviewed  No Known Allergies    Objective   First Vitals:   Height: 5' 10" (177.8 cm) (09/14/23 2300)  SpO2: 98 % (09/14/23 2349)    Current Vitals:   Height: 5' 10" (177.8 cm) (09/14/23 2300)  SpO2: 98 % (09/14/23 234)      Intake/Output Summary (Last 24 hours) at 9/15/2023 0005  Last data filed at 9/14/2023 2306  Gross per 24 hour   Intake 1800 ml   Output 200 ml   Net 1600 ml       Invasive Devices     Central Venous Catheter Line  Duration           CVC Central Lines 09/14/23 Triple 16cm <1 day          Peripheral Intravenous Line  Duration           Peripheral IV 09/14/23 Left Hand <1 day    Peripheral IV 09/14/23 Right Antecubital <1 day    Peripheral IV 09/14/23 Right Forearm <1 day          Arterial Line  Duration           Arterial Line 09/14/23 Left Radial <1 day          Drain  Duration           NG/OG/Enteral Tube Nasogastric 18 Fr Right nare <1 day          Airway  Duration           ETT  Cuffed;Oral 8 mm <1 day                Physical Exam  Vitals reviewed. Constitutional:       Appearance: He is ill-appearing and toxic-appearing. HENT:      Head: Normocephalic and atraumatic. Nose: Nose normal.      Mouth/Throat:      Mouth: Mucous membranes are moist.   Eyes:      Extraocular Movements: Extraocular movements intact. Cardiovascular:      Rate and Rhythm: Normal rate and regular rhythm. Pulses: Normal pulses. Pulmonary:      Effort: No respiratory distress. Abdominal:      General: There is no distension. Palpations: Abdomen is soft. Tenderness: There is abdominal tenderness. There is no guarding or rebound. Comments: Abdomen soft, nondistended, VAC draining serosanguineous   Musculoskeletal:         General: Normal range of motion. Cervical back: Normal range of motion. Skin:     General: Skin is warm. Capillary Refill: Capillary refill takes less than 2 seconds. Coloration: Skin is not jaundiced or pale. Lab Results:   I have personally reviewed pertinent lab results.   , CBC:   Lab Results   Component Value Date    WBC 8.31 09/14/2023    HGB 7.1 (L) 09/14/2023    HCT 20.6 (L) 09/14/2023     (H) 09/14/2023    PLT 82 (L) 09/14/2023    RBC 1.92 (L) 09/14/2023    MCH 37.0 (H) 09/14/2023    MCHC 34.5 09/14/2023    RDW 16.5 (H) 09/14/2023    MPV 12.0 09/14/2023   , CMP:   Lab Results   Component Value Date    SODIUM 129 (L) 09/14/2023    K 3.3 (L) 09/14/2023    CL 91 (L) 09/14/2023    CO2 17 (L) 09/14/2023    CO2 18 (L) 09/14/2023    BUN 54 (H) 09/14/2023    CREATININE 2.51 (H) 09/14/2023    GLUCOSE 125 09/14/2023    CALCIUM 7.3 (L) 09/14/2023    AST 43 (H) 09/14/2023    ALT 16 09/14/2023    ALKPHOS 179 (H) 09/14/2023    EGFR 26 09/14/2023   , Coagulation:   Lab Results   Component Value Date    INR 1.41 (H) 09/14/2023   , Lipase:   Lab Results   Component Value Date    LIPASE <6 (L) 09/14/2023     Imaging: I have personally reviewed pertinent reports. EKG, Pathology, and Other Studies: I have personally reviewed pertinent reports.

## 2023-09-15 NOTE — OP NOTE
OPERATIVE REPORT  PATIENT NAME: Jose Blanco    :  1965  MRN: [id number]  Pt Location: [facility] OR ROOM 02    SURGERY DATE: 2023    Surgeon(s) and Role: Meche Baker DO - Primary     * Henry Reddy MD - Assisting    Preop Diagnosis:  Diverticulitis of large intestine with perforation and abscess without bleeding [K57.20]    Post-Op Diagnosis Codes:     * Diverticulitis of large intestine with perforation and abscess without bleeding [K57.20]    Procedure(s):  EXPLORATORY LAPAROTOMY. . EXTENSIVE LYSIS OF ADHESIONS. SIGMOID RESECTION. PARTIAL OMENTECTOMY.  EXTENSIVE DEBRIDEMENT ABDOMINAL WALL AND DRAINAGE OF ABSCESS    Specimen(s):  ID Type Source Tests Collected by Time Destination   1 : sigmoid colon Tissue Large Intestine, Sigmoid Colon TISSUE EXAM Meche Baker DO 2023    2 :  Tissue Omentum TISSUE EXAM Meche Baker DO 2023    A : peritoneal fluid cultures, aerobic and anaerobic Body Fluid Peritoneal Fluid ANAEROBIC CULTURE AND GRAM STAIN, BODY FLUID CULTURE AND GRAM STAIN Meche Baker DO 2023 1835        Estimated Blood Loss:   25 mL    Drains:  NG/OG/Enteral Tube Nasogastric 18 Fr Right nare (Active)   Number of days: 0       Anesthesia Type:   General    Operative Indications:  Diverticulitis of large intestine with perforation and abscess without bleeding [K57.20]     Operative Findings:  Exploratory laparotomy, extensive lysis of adhesions  Sigmoid colon perforation with large abscess encompassing most of the left hemiabdomen, approximately 1.5 L of pus drained, sigmoidectomy, left in discontinuity  Necrotizing soft tissue infection of left abdominal wall, excisional debridement of peritoneum, preperitoneum and part of posterior fascia and muscle performed from inside the abdomen, total debrided area 20 cm x 18 cm  Partial omentectomy performed due to necrotic omentum  ABThera VAC placed     Complications:   None     Procedure and Technique:  Patient was brought into the operating room and identity and procedure were confirmed. Zayda Montano was placed supine on the operating room table and general anesthesia was induced.  Arterial line was placed by the anesthesia team.  Right IJ central line had previously been placed in the emergency department. [facility] catheter was placed.  The abdomen was widely prepped and draped in usual sterile fashion. Celia  was performed and all parties were in agreement.   A vertical midline incision was made using a 10 blade scalpel from approximately 5 cm superior to the umbilicus down to the pubis.  Dissection was carried down through subcutaneous tissue using Bovie electrocautery to expose the fascia.  The fascia was then incised above the umbilicus using Bovie electrocautery.  The peritoneum was then grasped using 2 hemostats and the abdomen was entered sharply using Metzenbaum scissors.  The fascia and peritoneum were then opened along the leg of the skin incision using Bovie electrocautery with a finger but needed to protect the intra-abdominal contents.  Immediately on upon entry into the abdomen foul-smelling cloudy fluid was encountered.  Extensive adhesions were encountered which were slowly lysed using gentle finger fracture technique.  Upon entering the left hemiabdomen a large abscess cavity was encountered and approximately 1.5 L of pus were drained.  Cultures of this fluid were sent.  All adhesions were lysed until the small bowel could be run from the ligament of Treitz to the terminal ileum.  No small bowel perforation was seen.  The colon was then examined and appeared normal up to the proximal sigmoid colon where extensive inflammation was noted.  This was slowly freed from surrounding structures.  There were dense adhesions noted to the bladder anteriorly which were bluntly dissected off.  Another 3 cm abscess pocket was noted between the sigmoid colon and the bladder which was drained.  Upon draining this a small 0.5 cm perforation was noted in the mid sigmoid colon.  The sigmoid colon was then freed down to soft and healthy appearing rectum.  Attention was then turned to our proximal transection.  A mesenteric window was made at the descending and sigmoid colon junction using a hemostat.  The sigmoid colon was then divided using a DUSTIN 100 blue load stapler.  The Enseal device was then used to divide the sigmoid colon mesentery in a sleeve type fashion along the inferior border of the colon.  The left ureter was identified and preserved during this process.  This was carried down to the point of healthy soft rectum distally and distal transection was performed using a green contour stapler.  Specimen was then passed off.  The rectal stump was then marked on both lateral borders of the staple line using a 3-0 Prolene suture.  At this point attention was turned to the abdominal wall in the area of the left abdominal abscess.  Extensive necrotic tissue on the underside of the abdominal wall was noted.  Excisional debridement using a combination of Bovie electrocautery and heavy Monahan scissors was performed including peritoneum, preperitoneum and small parts of the posterior fascia and muscle from the midline all the way down to the left flank encompassing an area of 20 cm x 18 cm.  The skin in the area and remaining tissue was examined and none appear to be grossly necrotic. Angel Diver was a large area of necrotic omentum within the abdomen in the area of the previous abscess so partial omentectomy was performed using Enseal device.  The abdomen was then copiously irrigated with 5 L of normal saline.  Hemostasis was ensured.  An ABThera VAC was then placed with 1 Octopus sponge and 1 blue sponge.  Suction was applied and no leaks were noted.  The patient was on 2 pressors at this point.  Total blood loss was approximately 250 cc and he received 2500 cc of crystalloid as well as 500 cc of 5% albumin during the case.  Patient was taken directly by [facility] to be transferred to [address] [place] [address] for further care.   Dr. Abida Romano was present for the entire procedure.     Patient Disposition:  intubated and critically ill, [facility] MD  DATE: September 14, 2023  TIME: 9:07 PM

## 2023-09-15 NOTE — PHYSICAL THERAPY NOTE
Physical Therapy Cancellation Note    PT orders received chart review completed. Pt is currently intubated/sedated and not appropriate to participate in skilled PT at this time. PT will follow and eval as medically appropriate. 09/15/23 1400   Note Type   Note type Cancelled Session; Evaluation   Cancel Reasons Intubated/sedated   Negative Pressure Wound Therapy (V.A.C.) Abdomen   Placement Date/Time: 09/14/23 2302   Inserted by: Mauricio Santos. DO  Location: Abdomen   Output (mL) 500 mL       Mahnaz Lowry PT

## 2023-09-15 NOTE — OP NOTE
OPERATIVE REPORT  PATIENT NAME: Rashida Reyes    :  1965  MRN: [id number]  Pt Location: BE OR ROOM 08    SURGERY DATE: 2023    Surgeon(s) and Role:     * Bimal Buchanan DO - Primary     * Fay Long MD - Assisting    Preop Diagnosis:  1. Perforated sigmoid diverticulitis. 2.  Left abdominal wall necrotizing soft tissue infection. 3.  Open abdomen. * No Diagnosis Codes entered *    Procedure(s):  1. REEXPLORATION FOR THOMBOSIS HEMORRHAGE OR INFECTION  2. PARTIAL LEFT COLLECTOMY  3. DEBRIDMENT OF ABDOMNAL WALL [id number] DOWN TO FASCIA AND MUSCLE 4. PARTIAL OMENTECTOMY 5 PERITONEAL LAVAGE     Specimen(s):  ID Type Source Tests Collected by Time Destination   1 : decending colon Tissue Large Intestine, Left/Descending Colon TISSUE EXAM Bimal Buchanan DO 2023 2240        Estimated Blood Loss:   Minimal    Drains:  NG/OG/Enteral Tube Nasogastric 18 Fr Right nare (Active)   Number of days: 0       Anesthesia Type:   General    Operative Indications:  1. Perforated sigmoid diverticulitis. 2.  Left abdominal wall necrotizing soft tissue infection. 3.  Open abdomen. Operative Findings:  1. ABThera dressing with 1 Octopus and 1 blue foam.  2.  Left upper quadrant/left flank/left iliac fossa abdominal wall NSTI [myositis/fasciitis]. 3.  Extensive excisional debridement [fascia and muscle] and drainage of pockets of purulence of the abdominal wall performed from the left subcostal margin superiorly to the left ASIS/iliac crest inferiorly. 4.  Total volume/area debrided measured 30 cm x 30 cm x 1 cm.  5.  Nonviable/dusky distal 5 cm of the descending colon stump resected with the curved stapler. 6.  Partial omentectomy performed for gangrenous omentum containing thrombosed vessels. 7.  Copious peritoneal lavage. 8.  Copious lavage/irrigation of the left abdominal wall performed with warm saline and hydrogen peroxide.   9.  Temporary abdominal closure with the ABThera dressing. Complications:   None    Procedure and Technique:  The patient was brought directly to the operating room [on pressors] following transport by air flight with an endotracheal tube already in place. He was positioned supine on the operating table and the ABThera dressing was taken down leaving only the Octopus. His abdomen was prepped with Betadine solution and sterile draping was done in routine fashion. A timeout was done with all in agreement. We then proceeded to take down the Octopus dressing and explored the abdominal cavity. We immediately noted an extensive area of muscle and fascia soft tissue infection in the left abdominal wall extending superiorly from the subcostal margin to the ASIS and iliac crest inferiorly. Extensive sharp excisional debridement of the muscles and fascia was performed and pockets of purulence encountered along the way drained. Debridement was performed with a combination of electrocautery and Monahan scissors far laterally to the border of the posterior abdominal wall. The total area debrided measured 30 cm x 30 cm and 1 cm deep. Using the sponge tissue forceps, debridement was further carried down till there was variable/patchy areas of tissue bleeding. A portion of the central omentum was noted to be gangrenous and this was resected with the Enseal device. We then turned our attention to the rectal stump and the proximal descending colon stump, the latter noted to be nonviable/dusky in the distal 5 cm. We then proceeded to perform a partial colectomy of this nonviable segment of the descending colon using the curved contour stapler. Copious four-quadrant abdominal irrigation was performed with warm normal saline. The left flank was also copiously irrigated with warm normal saline and hydrogen peroxide solution. Temporary abdominal closure was performed with the ABThera system [1 Octopus and 1 blue foam].   His pressor requirement improved upon conclusion of the case and he was transported to the ICU intubated. Dr. Sahara Fam was present for the entire procedure.       Patient Disposition:  Critical Care Unit and intubated and critically ill    This procedure was not performed to treat colon cancer through resection      SIGNATURE: Rober Cook MD  DATE: September 14, 2023  TIME: 11:52 PM

## 2023-09-15 NOTE — ANESTHESIA PREPROCEDURE EVALUATION
Procedure:  CHANGE DRESSING/VAC ABDOMEN (Abdomen)  LAPAROTOMY EXPLORATORY W/ BOWEL RESECTION (Abdomen)  DEBRIDEMENT WOUND ABDOMINAL  WALL ([address] OUT) (Left: Abdomen)    Relevant Problems   No relevant active problems    Alcohol use disorder with perforated sigmoid colon now here for take back ex lap. Physical Exam    Airway  Comment: ETT in place    Mallampati score: unable to assess  TM Distance: >3 FB  Neck ROM: full     Dental       Cardiovascular  Cardiovascular exam normal    Pulmonary  Pulmonary exam normal     Other Findings        Anesthesia Plan  ASA Score- 4 Emergent    Anesthesia Type- general with ASA Monitors. Additional Monitors:   Airway Plan: ETT. Comment: ETT already in place. Plan Factors-Exercise tolerance (METS): <4 METS. Chart reviewed. EKG reviewed. Existing labs reviewed. Patient summary reviewed. Induction- inhalational and intravenous. Postoperative Plan- Plan for postoperative opioid use. Informed Consent- Anesthetic plan and risks discussed with sibling (Andrey Gaspar listed as contact for emergency procedure and reviewed r/b/a with family member. Phone consent obtained for general anesthesia.). I personally reviewed this patient with the CRNA. Discussed and agreed on the Anesthesia Plan with the CRNA. Cecilio Mccracken

## 2023-09-15 NOTE — H&P
[address] [place] [address]  H&P: Critical Care  Name: Jayden Hays 60 y.o. male I MRN: [id number]  Unit/Bed#: OR POOL I Date of Admission: 9/14/2023   Date of Service: 9/14/2023 I Hospital Day: 0      Assessment/Plan   Neuro:   · Diagnosis: Pain/Sedation  · Plan:   · Propofol @ 20, fentanyl 50mcg/hr, fent 50mcg q1h prn  · CAM-ICU, delirium precuations      CV:   · Diagnosis: Septic shock 2/2 perforated diverticulitis/NSTI  · Plan:   · Wean pressors as able to maintain MAP>65  · Levo/vaso, consider addition of steroids if no improvement.   · Monitor and trend endpoints  · F/u cultures      Pulm:  · Diagnosis: Acute hypoxic respiratory failure  · Plan:  · Mechanical ventilation, wean as tolerated  · CXR  · F/u ABG      GI:   · Diagnosis: Acute perforated diverticulitis  · Plan:   · NPO/NGT  · Plan for second look laparotomy with general surgery on 9/15  · Monitor abdominal exam  · Continue IV abx      :   · Diagnosis: Acute kidney injury  · Plan:   · Monitor BUN/Cr  · Monitor urine output  · Strict I/O  · Continue canales catheter  · Diagnosis Hyponatremia in setting of NSTI and sepsis  · Plan  · Continue to monitor      F/E/N:    · F: [employer] cc/hr  · E: replace prn K>4, Mg>2, P>3      Heme/Onc:   · Diagnosis: Acute blood loss anemia  · Plan:   · Monitor for s/s of bleeding  · Transfuse 1 uprbc now, received 1 in OR  · Monitor for s/s of bleeding  · Diagnosis: thrombocytopenia, likely in setting of sepsis  · Received 1u platelets in OR   · Plan:  · Continue to monitor  · Diagnosis: DVT ppx  · Plan  · SQH, SCDs      Endo:   · Diagnosis: No acute issues  · Plan:   · Monitor as needed, BS goal 140-180      ID:   · Diagnosis: Septic shock 2/2 perforated diverticulitis/NSTI  · Plan:   · Continue broad spectrum abx, vanc/cefepime/flagyl/clinda  · Monitor blood cultures, F/u OR cultures  · Monitor fever/wbc curve      MSK/Skin:   · Diagnosis: Open abdomen  · Plan:   · Per general surgery, monitor output  · Turns reposioning, PT/OT when able      Disposition: Critical care       History of Present Illness     HPI: Fabiola Morgan is a 60 y.o. who presented to [facility] on 9/14 with left sided abdominal pain. Per chart review, pain associated with nausea, vomiting, and diarrhea. No reported PMHx or PSHx. On presentation he was found to have perforated diverticulitis w/ associated abscess. He was found to be acidotic w/ severe electrolyte abnormalities. Patient was taken emergently to the operating room at [facility] where he was found to have perforated sigmoid diverticulitis and an NSTI involving the abdominal wall secondary to the abscess cavity. He underwent a sigmoidectomy along with an extensive abdominal wall debridement. Patient was then tx to [facility] for further critical care and surgical management. Patient was immediately taken to the OR here at [facility] where additional descending colon was debrided along with more abdominal wall. History obtained from chart review. Review of Systems   Unable to perform ROS: Intubated      Historical Information   No past medical history on file. No past surgical history on file. No current outpatient medications No Known Allergies   Social History     Tobacco Use   • Smoking status: Never   • Smokeless tobacco: Never   Substance Use Topics   • Alcohol use: Yes     Comment: 8-10 can of beers a day   • Drug use: Not Currently    No family history on file.        Objective                            Vitals I/O      Most Recent Min/Max in 24hrs   Temp   Temp  Min: 97.2 °F (36.2 °C)  Max: 97.2 °F (36.2 °C)   Pulse   Pulse  Min: 95  Max: 102   Resp   Resp  Min: 18  Max: 22   BP   BP  Min: 89/53  Max: 100/52   O2 Sat   SpO2  Min: 94 %  Max: 99 %      Intake/Output Summary (Last 24 hours) at 9/14/2023 2301  Last data filed at 9/14/2023 2236  Gross per 24 hour   Intake 600 ml   Output 200 ml   Net 400 ml         No diet orders on file     Invasive Monitoring Physical exam Arterial Line  [place] BP    No data recorded   MAP    No data recorded    Physical Exam  Eyes:      Conjunctiva/sclera: Conjunctivae normal.   Skin:     General: Skin is warm and mottled extremities. HENT:      Head: Normocephalic and atraumatic. Nose: No congestion or rhinorrhea. Mouth/Throat:      Mouth: Mucous membranes are moist.   Cardiovascular:      Rate and Rhythm: Normal rate. Pulses: Normal pulses. Musculoskeletal:      Right lower leg: No edema. Left lower leg: No edema. Abdominal:      Palpations: Abdomen is soft. Tenderness: There is no abdominal tenderness. Comments: Some mottling of the left flank. Starr Mandaen in place good sxn     Constitutional:       General: He is in acute distress. Appearance: He is ill-appearing. Interventions: He is sedated and intubated. Pulmonary:      Effort: He is intubated. Comments: Mechanical breath sounds    Neurological:      Comments: Intubated/sedated   Genitourinary/Anorectal:  FoleyVitals and nursing note reviewed. Diagnostic Studies      EKG:   Imaging:  I have personally reviewed pertinent films in PACS     Medications:  Scheduled PRN        Continuous    No current facility-administered medications for this encounter.        Labs:    CBC    Recent Labs     09/14/23  1330 09/14/23  2153   WBC 13.00* 11.37*   HGB 11.4* 8.4*   HCT 30.7* 23.8*   PLT 52* 48*   BANDSPCT 10*  --      BMP    Recent Labs     09/14/23  1330 09/14/23  2153   SODIUM 127* 129*   K 2.2* 3.3*   CL 81* 91*   CO2 21 18*   AGAP 25 20   BUN 62* 54*   CREATININE 3.09* 2.51*   CALCIUM 7.0* 7.3*       Coags    Recent Labs     09/14/23  1330   INR 1.41*   PTT 35        Additional Electrolytes  Recent Labs     09/14/23  1330   MG 1.7*          Blood Gas    No recent results  Recent Labs     09/14/23  1330   PHVEN 7.416*   PAR8YKZ 35.9*   PO2VEN 39.6   [id number] 22.6*   BEVEN -1.5    LFTs  Recent Labs     09/14/23  1330   ALT 16   AST 43* ALKPHOS 179*   ALB 2.1*   TBILI 3.30*       Infectious  Recent Labs     09/14/23  1330   PROCALCITONI 24.18*     Glucose  Recent Labs     09/14/23  1330 09/14/23  2153   GLUC 79 132             Critical Care Time Delivered: Upon my evaluation, this patient had a high probability of imminent or life-threatening deterioration due to septic shock, NSTI, perforated sigmoid diverticulitis, which required my direct attention, intervention, and personal management. I have personally provided 60 minutes of critical care time, exclusive of procedures, teaching, family meetings, and any prior time recorded by providers other than myself.      Sourav Black, DO

## 2023-09-15 NOTE — QUICK NOTE
Post Op Check:    Patient intubated and sedated.     Vent Settings:  S(CMV)  420ml  6 Peep  50% FiO2    Pressors  Levo 25cmg/min  Vaso: 0.04 u/min    Drips:  Fent: 50 mcg/hr    Abthera:  ~250cc/hr output: 1400cc total postoperatively    UOP: 360cc Shift at 1600    Temp:  [94.3 °F (34.6 °C)-100.8 °F (38.2 °C)] 99.7 °F (37.6 °C)  HR:  [] 100  Resp:  [0-26] 26  BP: ()/(49-73) 134/73  Arterial Line BP: ()/(50-92) 98/62  FiO2 (%):  [50] 50    Physical Exam:  General: intubated and sedated  CV: regular rate  Lungs: SCMV ventilatory setting  Abdomen:Abthera in place with good seal, ss output    Marylou Ag MD  PGY-1   09/15/23

## 2023-09-15 NOTE — PLAN OF CARE
Problem: MOBILITY - ADULT  Goal: Maintain or return to baseline ADL function  Description: INTERVENTIONS:  -  Assess patient's ability to carry out ADLs; assess patient's baseline for ADL function and identify physical deficits which impact ability to perform ADLs (bathing, care of mouth/teeth, toileting, grooming, dressing, etc.)  - Assess/evaluate cause of self-care deficits   - Assess range of motion  - Assess patient's mobility; develop plan if impaired  - Assess patient's need for assistive devices and provide as appropriate  - Encourage maximum independence but intervene and supervise when necessary  - Involve family in performance of ADLs  - Assess for home care needs following discharge   - Consider OT consult to assist with ADL evaluation and planning for discharge  - Provide patient education as appropriate  Outcome: Progressing  Goal: Maintains/Returns to pre admission functional level  Description: INTERVENTIONS:  - Perform BMAT or MOVE assessment daily.   - Set and communicate daily mobility goal to care team and patient/family/caregiver.    - Collaborate with rehabilitation services on mobility goals if consulted  - Out of bed for toileting  - Record patient progress and toleration of activity level   Outcome: Progressing     Problem: PAIN - ADULT  Goal: Verbalizes/displays adequate comfort level or baseline comfort level  Description: Interventions:  - Encourage patient to monitor pain and request assistance  - Assess pain using appropriate pain scale  - Administer analgesics based on type and severity of pain and evaluate response  - Implement non-pharmacological measures as appropriate and evaluate response  - Consider cultural and social influences on pain and pain management  - Notify physician/advanced practitioner if interventions unsuccessful or patient reports new pain  Outcome: Progressing     Problem: INFECTION - ADULT  Goal: Absence or prevention of progression during hospitalization  Description: INTERVENTIONS:  - Assess and monitor for signs and symptoms of infection  - Monitor lab/diagnostic results  - Monitor all insertion sites, i.e. indwelling lines, tubes, and drains  - Monitor endotracheal if appropriate and nasal secretions for changes in amount and color  - [place] appropriate cooling/warming therapies per order  - Administer medications as ordered  - Instruct and encourage patient and family to use good hand hygiene technique  - Identify and instruct in appropriate isolation precautions for identified infection/condition  Outcome: Progressing  Goal: Absence of fever/infection during neutropenic period  Description: INTERVENTIONS:  - Monitor WBC    Outcome: Progressing     Problem: SAFETY ADULT  Goal: Maintain or return to baseline ADL function  Description: INTERVENTIONS:  -  Assess patient's ability to carry out ADLs; assess patient's baseline for ADL function and identify physical deficits which impact ability to perform ADLs (bathing, care of mouth/teeth, toileting, grooming, dressing, etc.)  - Assess/evaluate cause of self-care deficits   - Assess range of motion  - Assess patient's mobility; develop plan if impaired  - Assess patient's need for assistive devices and provide as appropriate  - Encourage maximum independence but intervene and supervise when necessary  - Involve family in performance of ADLs  - Assess for home care needs following discharge   - Consider OT consult to assist with ADL evaluation and planning for discharge  - Provide patient education as appropriate  Outcome: Progressing  Goal: Maintains/Returns to pre admission functional level  Description: INTERVENTIONS:  - Perform BMAT or MOVE assessment daily.   - Set and communicate daily mobility goal to care team and patient/family/caregiver.    - Collaborate with rehabilitation services on mobility goals if consulted  - Out of bed for toileting  - Record patient progress and toleration of activity level   Outcome: Progressing  Goal: Patient will remain free of falls  Description: INTERVENTIONS:  - Educate patient/family on patient safety including physical limitations  - Instruct patient to call for assistance with activity   - Consult OT/PT to assist with strengthening/mobility   - Keep Call bell within reach  - Keep bed low and locked with side rails adjusted as appropriate  - Keep care items and personal belongings within reach  - Initiate and maintain comfort rounds  - Make Fall Risk Sign visible to staff  - Apply yellow socks and bracelet for high fall risk patients  - Consider moving patient to room near nurses station  Outcome: Progressing     Problem: DISCHARGE PLANNING  Goal: Discharge to home or other facility with appropriate resources  Description: INTERVENTIONS:  - Identify barriers to discharge w/patient and caregiver  - Arrange for needed discharge resources and transportation as appropriate  - Identify discharge learning needs (meds, wound care, etc.)  - Arrange for interpretive services to assist at discharge as needed  - Refer to Case Management Department for coordinating discharge planning if the patient needs post-hospital services based on physician/advanced practitioner order or complex needs related to functional status, cognitive ability, or social support system  Outcome: Progressing     Problem: Knowledge Deficit  Goal: Patient/family/caregiver demonstrates understanding of disease process, treatment plan, medications, and discharge instructions  Description: Complete learning assessment and assess knowledge base.   Interventions:  - Provide teaching at level of understanding  - Provide teaching via preferred learning methods  Outcome: Progressing

## 2023-09-15 NOTE — PROGRESS NOTES
Damien Pena is a 60 y.o. male who is currently ordered Vancomycin IV with management by the Pharmacy Consult service. Relevant clinical data and objective / subjective history reviewed. Vancomycin Assessment:  Indication and Goal AUC/Trough: Soft tissue (goal -600, trough >10); Bacteremia (goal -600, trough >10)  Clinical Status: worsening  Micro:   : Blood Cultures : pending  Renal Function:  SCr: 2.51 mg/dL  CrCl: 32.7 mL/min  Renal replacement: Not on dialysis  Days of Therapy: 2  Current Dose: Loaded 1,000 mg IV once  Vancomycin Plan:  New Dosin,000 mg IV PRN when random vanco level <15  Estimated AUC: N/A  Estimated Trough: N/A  Next Level: 9/15 @ 0600  Renal Function Monitoring: Daily BMP and [place] will continue to follow closely for s/sx of nephrotoxicity, infusion reactions and appropriateness of therapy. BMP and CBC will be ordered per protocol. We will continue to follow the patient’s culture results and clinical progress daily.     Anne Fernandes, Pharmacist

## 2023-09-15 NOTE — OP NOTE
OPERATIVE REPORT  PATIENT NAME: Rashida Reyes    :  1965  MRN: [id number]  Pt Location:  OR ROOM 08    SURGERY DATE: 2023    Surgeon(s) and Role:     * Bimal Buchanan DO - Primary    Preop Diagnosis:  * No pre-op diagnosis entered *    * No Diagnosis Codes entered *    Procedure(s):  LAPAROTOMY EXPLORATORY W/ BOWEL RESECTION    Specimen(s):  * No specimens in log *    Estimated Blood Loss:   250 mL    Drains:  NG/OG/Enteral Tube Nasogastric 18 Fr Right nare (Active)   Number of days: 0       Anesthesia Type:   General    Operative Indications:  * No pre-op diagnosis entered *    Operative Findings:  Exploratory laparotomy, extensive lysis of adhesions  Sigmoid colon perforation with large abscess encompassing most of the left hemiabdomen, approximately 1.5 L of pus drained, sigmoidectomy, left in discontinuity  Necrotizing soft tissue infection of left abdominal wall, excisional debridement of peritoneum, preperitoneum and part of posterior fascia and muscle performed from inside the abdomen, total debrided area 20 cm x 18 cm  Partial omentectomy performed due to necrotic omentum  ABThera VAC placed    Complications:   None    Procedure and Technique:  Patient was brought into the operating room and identity and procedure were confirmed. Patient was placed supine on the operating room table and general anesthesia was induced. Arterial line was placed by the anesthesia team.  Right IJ central line had previously been placed in the emergency department. Portillo catheter was placed. The abdomen was widely prepped and draped in usual sterile fashion. Timeout was performed and all parties were in agreement. A vertical midline incision was made using a 10 blade scalpel from approximately 5 cm superior to the umbilicus down to the pubis. Dissection was carried down through subcutaneous tissue using Bovie electrocautery to expose the fascia.   The fascia was then incised above the umbilicus using Bovie electrocautery. The peritoneum was then grasped using 2 hemostats and the abdomen was entered sharply using Metzenbaum scissors. The fascia and peritoneum were then opened along the leg of the skin incision using Bovie electrocautery with a finger but needed to protect the intra-abdominal contents. Immediately on upon entry into the abdomen foul-smelling cloudy fluid was encountered. Extensive adhesions were encountered which were slowly lysed using gentle finger fracture technique. Upon entering the left hemiabdomen a large abscess cavity was encountered and approximately 1.5 L of pus were drained. Cultures of this fluid were sent. All adhesions were lysed until the small bowel could be run from the ligament of Treitz to the terminal ileum. No small bowel perforation was seen. The colon was then examined and appeared normal up to the proximal sigmoid colon where extensive inflammation was noted. This was slowly freed from surrounding structures. There were dense adhesions noted to the bladder anteriorly which were bluntly dissected off. Another 3 cm abscess pocket was noted between the sigmoid colon and the bladder which was drained. Upon draining this a small 0.5 cm perforation was noted in the mid sigmoid colon. The sigmoid colon was then freed down to soft and healthy appearing rectum. Attention was then turned to our proximal transection. A mesenteric window was made at the descending and sigmoid colon junction using a hemostat. The sigmoid colon was then divided using a DUSTIN 100 blue load stapler. The Enseal device was then used to divide the sigmoid colon mesentery in a sleeve type fashion along the inferior border of the colon. The left ureter was identified and preserved during this process. This was carried down to the point of healthy soft rectum distally and distal transection was performed using a green contour stapler. Specimen was then passed off.   The rectal stump was then marked on both lateral borders of the staple line using a 3-0 Prolene suture. At this point attention was turned to the abdominal wall in the area of the left abdominal abscess. Extensive necrotic tissue on the underside of the abdominal wall was noted. Excisional debridement using a combination of Bovie electrocautery and heavy Monahan scissors was performed including peritoneum, preperitoneum and small parts of the posterior fascia and muscle from the midline all the way down to the left flank encompassing an area of 20 cm x 18 cm. The skin in the area and remaining tissue was examined and none appear to be grossly necrotic. There was a large area of necrotic omentum within the abdomen in the area of the previous abscess so partial omentectomy was performed using Enseal device. The abdomen was then copiously irrigated with 5 L of normal saline. Hemostasis was ensured. An ABThera VAC was then placed with 1 Octopus sponge and 1 blue sponge. Suction was applied and no leaks were noted. The patient was on 2 pressors at this point. Total blood loss was approximately 250 cc and he received 2500 cc of crystalloid as well as 500 cc of 5% albumin during the case. Patient was taken directly by [facility] to be transferred to [facility] for further care. I was present for the entire procedure.     Patient Disposition:  intubated and critically ill, transferred directly from the OR to [facility] to be transferred to [facility]    This procedure was not performed to treat colon cancer through resection      SIGNATURE: Kiarra Brooks MD  DATE: September 14, 2023  TIME: 10:00 PM

## 2023-09-15 NOTE — ACP (ADVANCE CARE PLANNING)
Critical Care Advanced Care Planning Note  Usha Currie 60 y.o. male MRN: [id number]  [facility] En[facility]: [id number]    Usha Currie is a 60 y.o. male requiring critical care evaluation and advanced care planning. The patient has chronic comorbidities, including but not limited to chronic ETOH use/alcohol abuse, which is now further complicated by the following acute conditions: septic shock secondary to perforated diverticulitis and necrotizing soft tissue infection of abdominal wall, colonic necrosis, small bowel necrosis, acute kidney injury, acute hypoxic respiratory failure, lactic acidosis, metabolic acidosis. Due to the severity of the patient's acute condition and/or the extent of chronic conditions, additional conversations pertaining to advanced care planning were required. Today's discussion, which was held in a face-to-face meeting, included POA son Regine Escobar, as well as patient's father, siblings, sister in law, niece, and it was established that all stake holders understood the rationale for the advanced care planning. The patient was unable to participate in the discussion due to acute hypoxic respriatory failure requiring endotracheal intubation, encephalopathy. Summary of Discussion:  Gave family comprehensive update on medical events from presentation at [facility] to current including 3 operative interventions requiring colonic and small bowel resection and radical abdominal wall debridement/resection, septic shock requiring vasopressors, acute hypoxic respiratory failure requiring ETT and mechanical ventilation. Explained disease processes and requirement for many further trips to OR. Discussed the severity of illness and chance for decompensation at any time which could require emergent operative intervention, fatal arrhythmias, and/OR cardiac arrest. Discussed code status levels and ability to change code status at any time.  Family would like patient to remain full code at this time but understand his severity of illness. All questions were answered to family's satisfaction. Son Dustin Sheikh is next of kin by [place] state law but also agreed upon by family to be POA. Total time spent, (44) minutes (1315 to 1400).       CODE STATUS: FULL CODE - Level 1  POA:    POLST:        SIGNATURE: Danni Handy PA-C  DATE: September 15, 2023  TIME: 6:29 PM

## 2023-09-15 NOTE — CONSULTS
Vancomycin IV Pharmacy-to-Dose Consultation    Yuriy Abbott is a 60 y.o. male who was receiving Vancomycin IV with management by the Pharmacy Consult service for treatment of Soft tissue (goal -600, trough >10), Bacteremia (goal -600, trough >10)  . The patient’s Vancomycin therapy has been completed / discontinued. Thank you for allowing us to take part in this patient's care. Pharmacy will sign-off now; please call or re-consult if there are any questions. Griffin CEBALLOS. Ph

## 2023-09-15 NOTE — ANESTHESIA PREPROCEDURE EVALUATION
Procedure:  LAPAROTOMY EXPLORATORY W/ BOWEL RESECTION (Abdomen)    Relevant Problems   No relevant active problems      history of alcohol use who presents with septic shock and multisystem organ dysfunction 2/2 perforated sigmoid diverticulitis       Anesthesia Plan  ASA Score- 4 Emergent    Anesthesia Type- general with ASA Monitors. Additional Monitors: arterial line and central venous line. Airway Plan: ETT. Comment: ETT, central line, a line in situ. Plan Factors-    Chart reviewed. EKG reviewed. Existing labs reviewed. Patient summary reviewed. Induction- intravenous. Postoperative Plan-     Informed Consent-   I personally reviewed this patient with the CRNA. Discussed and agreed on the Anesthesia Plan with the CRNA. Kel Christian

## 2023-09-15 NOTE — ANESTHESIA POSTPROCEDURE EVALUATION
Post-Op Assessment Note    CV Status:  Stable  Pain Score: 0    Pain management: adequate     Mental Status:  Alert   Hydration Status:  Stable   PONV Controlled:  None   Airway Patency:  Patent      Post Op Vitals Reviewed: Yes      Staff: CRNA         No notable events documented.     BP     124/85   Temp  37.2 c   Pulse 109   Resp   14   SpO2   unreadable

## 2023-09-15 NOTE — OCCUPATIONAL THERAPY NOTE
Occupational Therapy Cancel Note     09/15/23 0746   OT Last Visit   OT Visit Date 09/15/23   Note Type   Note type Cancelled Session   Cancel Reasons Intubated/sedated           Evelyne Paget, OT

## 2023-09-15 NOTE — ANESTHESIA POSTPROCEDURE EVALUATION
Post-Op Assessment Note    CV Status:  Stable    Pain control: Unable to assess. Post-procedure mental status: Sedated. Hydration Status:  Euvolemic   PONV Controlled:  Controlled   Airway Patency:  Patent (ETT in place)  Airway: intubated      Post Op Vitals Reviewed: Yes      Staff: CRNA         No notable events documented.     BP   101/63   Temp  97.3   Pulse  68   Resp   18   SpO2   98%

## 2023-09-15 NOTE — OP NOTE
OPERATIVE REPORT  PATIENT NAME: Jaciel Tai    :  1965  MRN: [id number]  Pt Location: [facility] OR ROOM 10    SURGERY DATE: 9/15/2023    Surgeon(s) and Role:     * Ya Ramsey DO - Primary     * Sveta Freeman MD - Assisting    Preop Diagnosis:  Necrotizing soft tissue infection [M79.89]    Post-Op Diagnosis Codes:     * Necrotizing soft tissue infection [M79.89]    Procedure(s): CHANGE DRESSING/VAC ABDOMEN  LAPAROTOMY EXPLORATORY W/ BOWEL RESECTION  Left - DEBRIDEMENT WOUND ABDOMINAL  WALL ([address])    Specimen(s):  ID Type Source Tests Collected by Time Destination   1 : SMALL BOWEL Tissue Intestine TISSUE EXAM Ya Ramsey DO 9/15/2023 0831        Estimated Blood Loss:   Minimal    Drains:  NG/OG/Enteral Tube Nasogastric 18 Fr Right nare (Active)   Placement Reverification Aspiration 09/15/23 020   Site Assessment Clean;Dry; Intact 09/15/23 020   Status Suction-low continuous 09/15/23 0200   Drainage Appearance Gonzalo Brown 09/15/23 0400   Intake (mL) 0 mL 09/15/23 06   Output (mL) 0 mL 09/15/23 06   Number of days: 1       Urethral Catheter (Active)   Reasons to continue Urinary Catheter  Accurate I&O assessment in critically ill patients (48 hr. max) 09/15/23 020   Goal for Removal Voiding trial when ambulation improves 09/15/23 0200   Site Assessment Clean;Skin intact 09/15/23 020   Collection Container Standard drainage bag 09/15/23 020   Output (mL) 75 mL 09/15/23 0600   Number of days: 1       Anesthesia Type:   General    Operative Indications:  Necrotizing soft tissue infection [M79.89]    Operative Findings:  · 55 cm necrotic small bowel resected, as well as remainder of omentum  · Entire left abdominal wall and majority of left flank tissue with full-thickness necrosis -- resected   · Necrosis extended down to bladder, spermatic cord, and femoral sheath -- these structures were preserved but all overlying tissue was debrided  · Transverse colon was dusky and dilated but viable  · Minimal bleeding present throughout entirety of wound  · Wide excision debridement of skin, subcutaneous tissue, muscle, and fascia, down to level of bone  · Wound measurements: 45 cm x 45 cm x 15 cm  · ABThera VAC placed using 1 octopus, 2 blue sponges, 1 white sponge, 1 black sponge  · Received 4u pRBC, 2u FFP, 1 PLTs intraoperatively               Complications:   None    Procedure and Technique:  Patient was brought emergently from the intensive care unit to the operating room due to worsening shock and evidence of ongoing necrotizing fasciitis of left flank. He had remained intubated since his prior procedure and was on escalating doses of vasopressors. He required push-dose vasopressors during transport to OR. He was transferred to the operating table and his left flank was elevated using a bump. One blue ABThera sponge was removed. General anesthesia was induced, and he was prepped and draped in the usual sterile fashion. One ABThera octopus was removed from the abdomen. There was immediately significant malodor upon abdominal entry. The bowels appeared pale but mostly viable. The small bowel was run from ligament of trietz to terminal ileum. There was a 55 cm segment of necrotic small bowel with absent peristalsis that required resection. This was performed using linear DUSTIN stapler and EnSeal and the resected ends of small bowel were left in discontinuity. The mesentery was thickened, edematous, and friable throughout the entire abdomen. This was passed off the table as specimen. The colon was inspected and ascending and descending portions appeared viable. The transverse colon was dusky and dilated, but did appear viable and was left in situ. The remaining greater omentum was necrotic and was resected using EnSeal.     Skin over the left flank was concerning was ongoing NSTI. This was definitively confirmed upon visual inspection of the left intra-abdominal wall.  There was full-thickness necrosis of the entire left abdominal wall and flank. This entire areas was sharply debrided using #10 scalpel. Even once we had reach viable tissue, there was only minimal bleeding seen, although several small areas of bright bleeding were observed and controlled with suture ligation and judicious Bovie electrocautery usage. Debridement was carried laterally to about the level of the posterior axillary wall. Inferiorly, there was necrosis extending beyond the inguinal ligament (which was partially resected) to the upper anterior thigh and down to the level of the femoral sheath, which was exposed just below the inguinal ligament. The mons pubis was necrotic and was resected; debridement in this areas was carried down to the level of the bladder, which was noted to be pale but viable. The sheath of the left spermatic cord were necrotic and were resected, leaving the spermatic cord contents intact. The right spermatic cord was viable. At this point, the patient was maxed on three vasopressors. Satisfied with the margins of our debridement, we proceeded with ABThera VAC placement. The wound now measured 45 x 45 cm x 15 cm. The bowel were completely encased within one octopus. A while sponge was placed over the bladder, spermatic cords, and femoral sheath. Two blue sponges and one black XL sponge were placed over the entirety of the wound and secured with ioban and clear VAC drapes. The VAC was set to negative pressure with good seal.     All counts were correct x2 at the conclusion of the procedure. The patient was transported directly back to the intensive care unit from the operating room intubated and on multiple vasopressors. He received a total of 4 units pRBC, 2u FFP, and 1 of platelets intra-operatively.      He will need to return to the OR in the near future as both his small bowel and his colon are in discontinuity, as well as for re-evaluation of tissue integrity and possible further excisional debridement. Dr. Mac Cervantes was present for the entire procedure.     Patient Disposition:  PACU , intubated and critically ill and patient will return to OR for planned surgery as a staged procedure    This procedure was not performed to treat colon cancer through resection      SIGNATURE: Marta Fox MD  DATE: September 15, 2023  TIME: 9:30 AM

## 2023-09-15 NOTE — PROCEDURES
Arterial Line Insertion    Date/Time: 9/15/2023 6:26 PM    Performed by: Chip Rosales PA-C  Authorized by: Chip Rosales PA-C    Patient location:  Bedside  Other Assisting Provider: Yes (comment) (Dr. Jeimy Deal)    Consent:     Consent obtained:  Verbal    Consent given by:  Healthcare agent (Son)    Risks discussed:  Bleeding, repeat procedure, ischemia, infection and pain  Universal protocol:     Procedure explained and questions answered to patient or proxy's satisfaction: yes      Relevant documents present and verified: yes      Patient identity confirmed:  Arm band  Indications:     Indications: hemodynamic monitoring, multiple ABGs, continuous blood pressure monitoring and frequent labs / infusion    Pre-procedure details:     Skin preparation:  Chlorhexidine    Preparation: Patient was prepped and draped in sterile fashion    Anesthesia (see MAR for exact dosages): Anesthesia method:  None  Procedure details:     Location / Tip of Catheter:  Femoral    Laterality:  Right    Needle gauge:  18 G    Placement technique:  Percutaneous, Seldinger and ultrasound guided    Ultrasound image availability:  Not obtained due to urgency    Sterile ultrasound techniques: Sterile gel and sterile probe covers were used      Number of attempts:  4    Successful placement: no    Post-procedure details:     Post-procedure: Pressure dressing with gauze, tegaderm, and pressure tape. Complication (if applicable):  Local hematoma   Comments:      Multiple attempts by myself and Dr. Lala Up, able to easily stick femoral artery with pulsatile blood return, unable to tread wire smoothly. Wire kinking and bending with threading. Developing local hematoma at this time with thrombocytopenia and procedure aborted. Manual R groin pressure held x 10 minutes and pressure dressing applied.

## 2023-09-15 NOTE — OCCUPATIONAL THERAPY NOTE
Occupational Therapy Cancel Note       09/15/23 5766   OT Last Visit   OT Visit Date 09/15/23   Note Type   Note type Cancelled Session   Cancel Reasons Intubated/sedated         Sandra Hua, OT

## 2023-09-15 NOTE — UTILIZATION REVIEW
Initial Clinical Review    Admission: Date/Time/Statement:   Admission Orders (From admission, onward)     Ordered        09/14/23 2351  Inpatient Admission  Once                      Orders Placed This Encounter   Procedures   • Inpatient Admission     Standing Status:   Standing     Number of Occurrences:   1     Order Specific Question:   Level of Care     Answer:   Critical Care [15]     Order Specific Question:   Estimated length of stay     Answer:   More than 2 Midnights     Order Specific Question:   Certification     Answer:   I certify that inpatient services are medically necessary for this patient for a duration of greater than two midnights. See H&P and MD Progress Notes for additional information about the patient's course of treatment. ED Arrival Information     Patient not seen in ED                     No chief complaint on file. Initial Presentation: 60 y.o. male w/ no reported PMHx or PSHx was transferred fro [address] [place] [address] for a higher level of care. Pt initially presented to [address] [place] [address] on 9/14 w/ L sided abdominal pain associated w/ nausea, vomiting and diarrhea. In the ED, he was found to have a perforated diverticulitis w/ associated abscess on imaging, found to be acidotic w/ severe electrolyte abnormalities. He was taken emergently to the OR where he was found to have perforated sigmoid diverticulitis and an NSTI involving the abdominal wall secondary to the abscess cavity. He underwent a sigmoidectomy along with an extensive abdominal wall debridement. Transferred to [facility] for further critical care and surgical management. On arrival to [facility], on exam, pt intubated, sedated, on MV, mottling on the L flank, Abthera VAC in place. He was taken immediately to the OR for additional descending colon and abdominal wall debridement.      Admitted as Inpatient for evaluation and treatment of Septic shock 2/2 perforated diverticulitis/NSTI, Acute hypoxic respiratory failure, Acute perforated diverticulitis, SHANIQUA, ABLA. Plan: cont MV, wean as tolerated, sedation. On Levo/vaso,wean pressors as able to maintain MAP>65. F/u abg. Mon bun/cr, mon uop, strict I/O. Cont IVF. Mon s/s of bleeding. Transfuse 1 u prbc. DVT ppx. Cont vanco, cefepime, flagyl, clinda. F/u OR cxs. Mon bld cxs. Mon fever/wbc curve.    09/14 OP Note:  SURGERY DATE: 9/14/2023  Procedure(s):  1. REEXPLORATION FOR THOMBOSIS HEMORRHAGE OR INFECTION  2. PARTIAL LEFT COLLECTOMY  3. DEBRIDMENT OF ABDOMNAL WALL [id number] DOWN TO FASCIA AND MUSCLE 4. PARTIAL OMENTECTOMY 5 PERITONEAL LAVAGE   Anesthesia Type: General   Operative Findings:  1. ABThera dressing with 1 Octopus and 1 blue foam.  2.  Left upper quadrant/left flank/left iliac fossa abdominal wall NSTI [myositis/fasciitis]. 3.  Extensive excisional debridement [fascia and muscle] and drainage of pockets of purulence of the abdominal wall performed from the left subcostal margin superiorly to the left ASIS/iliac crest inferiorly. 4.  Total volume/area debrided measured 30 cm x 30 cm x 1 cm.  5.  Nonviable/dusky distal 5 cm of the descending colon stump resected with the curved stapler. 6.  Partial omentectomy performed for gangrenous omentum containing thrombosed vessels. 7.  Copious peritoneal lavage. 8.  Copious lavage/irrigation of the left abdominal wall performed with warm saline and hydrogen peroxide. 9.  Temporary abdominal closure with the ABThera dressing.     Date: 09/15  Day 2:   Gen Surg Consult: Pt intubated, sedated, Vent SIMV 16/500/6/50, levo at 6, Zackary at 75. BMP pending, was hyponatremic hypokalemic with SHANIQUA. Blood cultures pending. . Plan for takeback to the OR for reexploration today 9/15. Wean pressors as rickie. Follow lactate, labs. Cefepime, flagyl, vanco, clinda, SQH, pain control, IVF. OP Note:  SURGERY DATE: 9/15/2023  Procedure(s):   CHANGE DRESSING/VAC ABDOMEN  LAPAROTOMY EXPLORATORY W/ BOWEL RESECTION  Left - DEBRIDEMENT WOUND ABDOMINAL WALL [facility] OUT)  Anesthesia Type:   General  Operative Findings:  • 55 cm necrotic small bowel resected, as well as remainder of omentum  • Entire left abdominal wall and majority of left flank tissue with full-thickness necrosis -- resected   • Necrosis extended down to bladder, spermatic cord, and femoral sheath -- these structures were preserved but all overlying tissue was debrided  • Transverse colon was dusky and dilated but viable  • Minimal bleeding present throughout entirety of wound  • Wide excision debridement of skin, subcutaneous tissue, muscle, and fascia, down to level of bone  • Wound measurements: 45 cm x 45 cm x 15 cm  • ABThera VAC placed using 1 octopus, 2 blue sponges, 1 white sponge, 1 black sponge  • Received 4u pRBC, 2u FFP, 1 PLTs intraoperatively         ED Triage Vitals   Temperature Pulse Respirations Blood Pressure SpO2   09/15/23 0100 09/14/23 2345 09/15/23 0106 09/15/23 0106 09/14/23 2345   (!) 94.3 °F (34.6 °C) 94 16 (!) 100/49 100 %      Temp Source Heart Rate Source Patient Position - Orthostatic VS BP Location FiO2 (%)   09/15/23 0100 09/15/23 0400 -- -- 09/14/23 2345   Esophageal Monitor   50      Pain Score       --                 Wt Readings from Last 1 Encounters:   09/15/23 80 kg (176 lb 5.9 oz)     Additional Vital Signs:   Date/Time Temp Pulse Resp BP MAP (mmHg) Arterial Line BP MAP SpO2 FiO2 (%) O2 Device CVP (mean)   09/15/23 1130 -- 104 26 Abnormal  -- -- 112/92 104 mmHg 99 % -- -- 12 mmHg   09/15/23 1100 98.2 °F (36.8 °C) 102 0 Abnormal  -- -- 118/72 94 mmHg 100 % -- -- --   09/15/23 0756 -- -- -- -- -- -- -- -- -- Ventilator  --   O2 Device: ETT at 09/15/23 0756   09/15/23 0701 100.8 °F (38.2 °C) Abnormal  -- -- -- -- -- -- -- -- -- --   09/15/23 0700 100.8 °F (38.2 °C) Abnormal  106 Abnormal  20 -- 77 78/56 66 mmHg 90 % -- -- --   09/15/23 0600 100 °F (37.8 °C) 110 Abnormal  23 Abnormal  -- -- 88/54 68 mmHg 100 % -- -- --   09/15/23 0500 99.3 °F (37.4 °C) 112 Abnormal  19 -- -- 138/66 92 mmHg 99 % -- -- --   09/15/23 0400 98.6 °F (37 °C) 104 16 -- -- 136/62 86 mmHg 100 % 50 Ventilator --   09/15/23 0312 -- -- -- -- -- -- -- 97 % -- -- --   09/15/23 0300 97.2 °F (36.2 °C) Abnormal  96 16 -- -- 134/60 86 mmHg 100 % -- -- --   09/15/23 0200 95.4 °F (35.2 °C) Abnormal  86 16 -- -- 96/52 70 mmHg 100 % -- -- --   09/15/23 0125 94.6 °F (34.8 °C) Abnormal  87 15 112/55 -- -- -- -- -- -- --   09/15/23 0106 94.5 °F (34.7 °C) Abnormal  88 16 100/49 Abnormal  -- -- -- -- -- -- --   09/15/23 0100 94.3 °F (34.6 °C) Abnormal  84 -- -- -- 98/50 70 mmHg 96 % -- -- --   09/15/23 0000 -- 94 -- -- 90 102/54 72 mmHg 100 % -- -- --   09/14/23 2349 -- -- -- -- -- -- -- 98 % -- -- --         Pertinent Labs/Diagnostic Test Results:   X-ray chest 1 view   Final Result by Marisa Srivastava DO (09/15 0845)      Lines and tubes as above. Mild bibasilar subsegmental atelectasis. Element of trace pneumonia on the right not excluded. Workstation performed: [id number]           09/15 EKG result:Sinus tachycardia  Low voltage QRS  Left anterior fascicular block  Nonspecific T wave abnormality    Results from last 7 days   Lab Units 09/14/23  1330   SARS-COV-2  Negative     Results from last 7 days   Lab Units 09/15/23  1028 09/15/23  1012 09/15/23  0900 09/15/23  0833 09/15/23  0813 09/15/23  0641 09/15/23  0453 09/14/23  1902 09/14/23  1330   WBC Thousand/uL  --  7.00  --   --   --  8.78 8.73   < > 13.00*   HEMOGLOBIN g/dL  --  9.6*  --   --   --  8.4* 8.7*   < > 11.4*   I STAT HEMOGLOBIN g/dl 9.5*  --  7.1* 5.1* 6.5*  --   --    < >  --    HEMATOCRIT %  --  28.8*  --   --   --  24.2* 24.5*   < > 30.7*   HEMATOCRIT, ISTAT % 28*  --  21* 15* 19*  --   --    < >  --    PLATELETS Thousands/uL  --  58*  --   --   --  61* 64*   < > 52*   BANDS PCT %  --   --   --   --   --   --   --   --  10*    < > = values in this interval not displayed.          Results from last 7 days   Lab Units 09/15/23  1028 09/15/23  1012 09/15/23  0900 09/15/23  0833 09/15/23  0813 09/15/23  0641 09/15/23  0453 09/14/23  2346 09/14/23  2209 09/14/23  2153 09/14/23  1902 09/14/23  1330   SODIUM mmol/L  --  137  --   --   --  131* 130* 130*  --  129*  --  127*   POTASSIUM mmol/L  --  4.2  --   --   --  4.5 4.3 3.3*  --  3.3*  --  2.2*   CHLORIDE mmol/L  --  99  --   --   --  97 96 95*  --  91*  --  81*   CO2 mmol/L  --  19*  --   --   --  10* 16* 16*  --  18*  --  21   CO2, I-STAT mmol/L 21  --  19* 22 21  --   --   --    < >  --    < >  --    ANION GAP mmol/L  --  19  --   --   --  24 18 19  --  20  --  25   BUN mg/dL  --  49*  --   --   --  52* 51* 51*  --  54*  --  62*   CREATININE mg/dL  --  2.11*  --   --   --  2.46* 2.34* 2.42*  --  2.51*  --  3.09*   EGFR ml/min/1.73sq m  --  33  --   --   --  27 29 28  --  26  --  20   CALCIUM mg/dL  --  8.1*  --   --   --  6.8* 6.7* 7.1*  --  7.3*  --  7.0*   CALCIUM, IONIZED mmol/L  --  0.97*  --   --   --  0.91* 0.92* 1.01*   < >  --   --   --    CALCIUM, IONIZED, ISTAT mmol/L 1.01*  --  1.10* 1.04* 0.89*  --   --   --    < >  --    < >  --    MAGNESIUM mg/dL  --  2.2  --   --   --   --  2.3 2.1  --   --   --  1.7*   PHOSPHORUS mg/dL  --  7.3*  --   --   --   --  5.5* 6.6*  --   --   --   --     < > = values in this interval not displayed.      Results from last 7 days   Lab Units 09/15/23  1012 09/15/23  0641 09/14/23  2346 09/14/23  1330   AST U/L 75* 57* 42* 43*   ALT U/L 21 18 14 16   ALK PHOS U/L 75 98 117* 179*   TOTAL PROTEIN g/dL 3.4* 3.5* 3.4* 4.6*   ALBUMIN g/dL 2.0* 2.0* 2.1* 2.1*   TOTAL BILIRUBIN mg/dL 4.29* 4.24* 1.97* 3.30*   BILIRUBIN DIRECT mg/dL  --   --  1.37* 1.93*   AMMONIA umol/L  --   --   --  62         Results from last 7 days   Lab Units 09/15/23  1012 09/15/23  0641 09/15/23  0453 09/14/23  2346 09/14/23  2153 09/14/23  1330   GLUCOSE RANDOM mg/dL 96 49* 90 89 132 79             BETA-HYDROXYBUTYRATE   Date Value Ref Range Status   09/14/2023 0.8 (H) <0.6 mmol/L Final      Results from last 7 days   Lab Units 09/15/23  1027 09/15/23  0641 09/15/23  0514 09/15/23  0031   PH ART  7.278* 7.129* 7.201* 7.184*   PCO2 ART mm Hg 39.4 28.8* 40.5 42.0   PO2 ART mm Hg 102.6 173.0* 156.3* 93.8   HCO3 ART mmol/L 18.0* 9.3* 15.5* 15.5*   BASE EXC ART mmol/L -8.2 -18.4 -11.7 -11.9   O2 CONTENT ART mL/dL 14.6* 12.9* 13.0* 10.2*   O2 HGB, ARTERIAL % 96.2 97.4* 97.0 93.3*   ABG SOURCE  Line, Arterial Line, Arterial  --  Line, Arterial     Results from last 7 days   Lab Units 09/14/23  1330   PH DAYA  7.416*   PCO2 DAYA mm Hg 35.9*   PO2 DAYA mm Hg 39.6   HCO3 DAYA mmol/L 22.6*   BASE EXC DAYA mmol/L -1.5   O2 CONTENT DAYA ml/dL 12.3   O2 HGB, VENOUS % 70.8     Results from last 7 days   Lab Units 09/15/23  1028 09/15/23  0900 09/15/23  0833 09/14/23 2209 09/14/23 2012 09/14/23  1902   PH, DAYA I-STAT   --   --   --   --  7.282* 7.248*   PCO2, DAYA ISTAT mm HG  --   --   --   --  42.8 48.7   PO2, DAYA ISTAT mm HG  --   --   --   --  145.0* 149.0*   HCO3, DAYA ISTAT mmol/L  --   --   --   --  20.2* 21.3*   I STAT BASE EXC mmol/L -6* -10* -7*   < > -6* -6*   I STAT O2 SAT % 97* 100* 100*   < > 99* 99*   ISTAT PH ART  7.292* 7.183* 7.241*   < >  --   --    I STAT ART PCO2 mm HG 41.7 46.8* 46.8*   < >  --   --    I STAT ART PO2 mm HG 98.0 384.0* 386.0*   < >  --   --    I STAT ART HCO3 mmol/L 20.1* 17.6* 20.1*   < >  --   --     < > = values in this interval not displayed.      Results from last 7 days   Lab Units 09/14/23  1330   CK TOTAL U/L 233     Results from last 7 days   Lab Units 09/14/23  1607 09/14/23  1330   HS TNI 0HR ng/L  --  17   HS TNI 2HR ng/L 15  --    HSTNI D2 ng/L -2  --          Results from last 7 days   Lab Units 09/15/23  1012 09/14/23  1330   PROTIME seconds 21.2* 17.1*   INR  1.81* 1.41*   PTT seconds  --  35     Results from last 7 days   Lab Units 09/14/23  1330   TSH 3RD GENERATON uIU/mL 3.621     Results from last 7 days   Lab Units 09/14/23  1330 PROCALCITONIN ng/ml 24.18*     Results from last 7 days   Lab Units 09/15/23  1012 09/15/23  0641 09/15/23  0318 09/14/23  2346 09/14/23  1607 09/14/23  1330   LACTIC ACID mmol/L 8.7* 11.5* 5.1* 7.6* 4.1* 6.3*                         Results from last 7 days   Lab Units 09/15/23  0904 09/15/23  0858 09/15/23  0836 09/15/23  0736 09/15/23  0555   UNIT PRODUCT CODE  [id number]  [id number] S5989R20  N4857N55  Q3155K84 U8458F75  A8938H66 U5878T48  G5110Y41 T9490D57  C9098N59  P2071K90   UNIT NUMBER  [id number]  [id number]  [id number]  [id number]  [id number]  [id number]  [id number]  [id number]   UNITABO  A  A A  A  A A  A A  A A  A  A   UNITRH  NEG  NEG POS  POS  POS NEG  NEG NEG  NEG NEG  NEG  NEG   CROSSMATCH  Compatible  Compatible  --  Compatible  Compatible Compatible  Compatible Compatible  Compatible   UNIT DISPENSE STATUS  Issued  Issued Issued  Issued  Issued Issued  Issued Return to [employer]  Return to [employer] Presumed Trans  Presumed Trans  Presumed Trans   UNIT PRODUCT VOL mL 350  350 280  280  256 350  300 350  350 241  350  350         Results from last 7 days   Lab Units 09/14/23  1330   LIPASE u/L <6*                     Results from last 7 days   Lab Units 09/14/23  1330   INFLUENZA A PCR  Negative   INFLUENZA B PCR  Negative   RSV PCR  Negative             Results from last 7 days   Lab Units 09/14/23  1330   ETHANOL LVL mg/dL <10   ACETAMINOPHEN LVL ug/mL <79*   SALICYLATE LVL mg/dL <5                 Results from last 7 days   Lab Units 09/15/23  0318 09/14/23  1835 09/14/23  1330   BLOOD CULTURE  Received in Microbiology Lab. Culture in Progress. Received in Microbiology Lab. Culture in Progress. --  Received in Microbiology Lab. Culture in Progress.    GRAM STAIN RESULT   --  No polys seen*  4+ Gram positive cocci in pairs*  4+ Gram positive rods*  4+ Gram negative rods* Gram negative rods*             Results from last 7 days   Lab Units 09/15/23  0453   VANCOMYCIN RM ug/mL 8.5*       ED Treatment:   Medication Administration - No Administrations Displayed (No Start Event Found)     None        No past medical history on file. Present on Admission:  • Perforated sigmoid colon ([address])  • Necrotizing soft tissue infection      Admitting Diagnosis: No admission diagnoses are documented for this encounter. Age/Sex: 60 y.o. male  Admission Orders:  SCD  NGT to low continuous suction  Cardio-Pulmonary Monitoring, Neuro Checks, Nursing dysphagia screen, I/O, Daily weights, Vital signs  NPO    Scheduled Medications:  calcium gluconate, 1 g, Intravenous, Once  cefepime, 2,000 mg, Intravenous, Q8H  chlorhexidine, 15 mL, Mouth/Throat, Q12H RAMON  clindamycin, 900 mg, Intravenous, Q8H  heparin (porcine), 5,000 Units, Subcutaneous, Q8H RAMON  hydrocortisone sodium succinate, 50 mg, Intravenous, Q6H RAMON  metroNIDAZOLE, 500 mg, Intravenous, Q8H  vancomycin, 15 mg/kg, Intravenous, Once      Continuous IV Infusions:  dexmedetomidine, 0.1-0.7 mcg/kg/hr, Intravenous, Titrated  epinephrine, 1-10 mcg/min, Intravenous, Titrated  fentaNYL, 50 mcg/hr, Intravenous, Continuous  multi-electrolyte, 125 mL/hr, Intravenous, Continuous  norepinephrine, 1-30 mcg/min, Intravenous, Titrated  vasopressin, 0.04 Units/min, Intravenous, Continuous      PRN Meds:  fentanyl citrate (PF), 50 mcg, Intravenous, Q1H PRN  vancomycin, 15 mg/kg, Intravenous, Daily PRN        IP CONSULT TO CASE MANAGEMENT  IP CONSULT TO PHARMACY    Network Utilization Review Department  ATTENTION: Please call with any questions or concerns to [phone] and carefully listen to the prompts so that you are directed to the right person.  All voicemails are confidential.  Tatiana Diallo all requests for admission clinical reviews, approved or denied determinations and any other requests to dedicated fax number below belonging to the campus where the patient is receiving treatment.  List of dedicated fax numbers for the Facilities:  [place] DENIALS (Administrative/Medical Necessity) [phone] [address] (Maternity/NICU/Pediatrics) [phone]   [address] [place] [address] [phone]   [facility] [address] [facility] [phone] [id number] [place] [address] [phone]   [address] [address] [phone]

## 2023-09-15 NOTE — OP NOTE
OPERATIVE REPORT  PATIENT NAME: Sandy Han    :  1965  MRN: [id number]  Pt Location: [facility] OR ROOM 02    SURGERY DATE: 2023    Surgeon(s) and Role: Penny Dia DO - Primary     * Evelio Wisdom MD - Assisting    Preop Diagnosis:  Diverticulitis of large intestine with perforation and abscess without bleeding [K57.20]    Post-Op Diagnosis Codes:     * Diverticulitis of large intestine with perforation and abscess without bleeding [K57.20]    Procedure(s):  EXPLORATORY LAPAROTOMY. . EXTENSIVE LYSIS OF ADHESIONS. SIGMOID RESECTION. PARTIAL OMENTECTOMY.  EXTENSIVE DEBRIDEMENT ABDOMINAL WALL AND DRAINAGE OF ABSCESS    Specimen(s):  ID Type Source Tests Collected by Time Destination   1 : sigmoid colon Tissue Large Intestine, Sigmoid Colon TISSUE EXAM Pennysegundo Dia,  2023    2 :  Tissue Omentum TISSUE EXAM Penny Jointcharmaine,  2023    A : peritoneal fluid cultures, aerobic and anaerobic Body Fluid Peritoneal Fluid ANAEROBIC CULTURE AND GRAM STAIN, BODY FLUID CULTURE AND GRAM STAIN [facility] Ifeoma,  2023 1835        Estimated Blood Loss:   25 mL    Drains:  NG/OG/Enteral Tube Nasogastric 18 Fr Right nare (Active)   Number of days: 0       Anesthesia Type:   General    Operative Indications:  Diverticulitis of large intestine with perforation and abscess without bleeding [K57.20]    Operative Findings:  Exploratory laparotomy, extensive lysis of adhesions  Sigmoid colon perforation with large abscess encompassing most of the left hemiabdomen, approximately 1.5 L of pus drained, sigmoidectomy, left in discontinuity  Necrotizing soft tissue infection of left abdominal wall, excisional debridement of peritoneum, preperitoneum and part of posterior fascia and muscle performed from inside the abdomen, total debrided area 20 cm x 18 cm  Partial omentectomy performed due to necrotic omentum  ABThera VAC placed     Complications:   None     Procedure and Technique:  Patient was brought into the operating room and identity and procedure were confirmed. Patient was placed supine on the operating room table and general anesthesia was induced. Arterial line was placed by the anesthesia team.  Right IJ central line had previously been placed in the emergency department. Portillo catheter was placed. The abdomen was widely prepped and draped in usual sterile fashion. Timeout was performed and all parties were in agreement. A vertical midline incision was made using a 10 blade scalpel from approximately 5 cm superior to the umbilicus down to the pubis. Dissection was carried down through subcutaneous tissue using Bovie electrocautery to expose the fascia. The fascia was then incised above the umbilicus using Bovie electrocautery. The peritoneum was then grasped using 2 hemostats and the abdomen was entered sharply using Metzenbaum scissors. The fascia and peritoneum were then opened along the leg of the skin incision using Bovie electrocautery with a finger but needed to protect the intra-abdominal contents. Immediately on upon entry into the abdomen foul-smelling cloudy fluid was encountered. Extensive adhesions were encountered which were slowly lysed using gentle finger fracture technique. Upon entering the left hemiabdomen a large abscess cavity was encountered and approximately 1.5 L of pus were drained. Cultures of this fluid were sent. All adhesions were lysed until the small bowel could be run from the ligament of Treitz to the terminal ileum. No small bowel perforation was seen. The colon was then examined and appeared normal up to the proximal sigmoid colon where extensive inflammation was noted. This was slowly freed from surrounding structures. There were dense adhesions noted to the bladder anteriorly which were bluntly dissected off. Another 3 cm abscess pocket was noted between the sigmoid colon and the bladder which was drained.   Upon draining this a small 0.5 cm perforation was noted in the mid sigmoid colon. The sigmoid colon was then freed down to soft and healthy appearing rectum. Attention was then turned to our proximal transection. A mesenteric window was made at the descending and sigmoid colon junction using a hemostat. The sigmoid colon was then divided using a DUSTIN 100 blue load stapler. The Enseal device was then used to divide the sigmoid colon mesentery in a sleeve type fashion along the inferior border of the colon. The left ureter was identified and preserved during this process. This was carried down to the point of healthy soft rectum distally and distal transection was performed using a green contour stapler. Specimen was then passed off. The rectal stump was then marked on both lateral borders of the staple line using a 3-0 Prolene suture. At this point attention was turned to the abdominal wall in the area of the left abdominal abscess. Extensive necrotic tissue on the underside of the abdominal wall was noted. Excisional debridement using a combination of Bovie electrocautery and heavy Monahan scissors was performed including peritoneum, preperitoneum and small parts of the posterior fascia and muscle from the midline all the way down to the left flank encompassing an area of 20 cm x 18 cm. The skin in the area and remaining tissue was examined and none appear to be grossly necrotic. There was a large area of necrotic omentum within the abdomen in the area of the previous abscess so partial omentectomy was performed using Enseal device. The abdomen was then copiously irrigated with 5 L of normal saline. Hemostasis was ensured. An ABThera VAC was then placed with 1 Octopus sponge and 1 blue sponge. Suction was applied and no leaks were noted. The patient was on 2 pressors at this point. Total blood loss was approximately 250 cc and he received 2500 cc of crystalloid as well as 500 cc of 5% albumin during the case.   Patient was taken directly by [facility] to be transferred to [facility] for further care.   Dr. Karin Haskins was present for the entire procedure.     Patient Disposition:  intubated and critically ill, transferred directly from the OR to [facility] to be transferred to [facility] HOSPTIAL: Sheree Starkey MD  DATE: September 14, 2023  TIME: 10:37 PM

## 2023-09-15 NOTE — ANESTHESIA POSTPROCEDURE EVALUATION
Post-Op Assessment Note    CV Status:  Stable       Airway Patency:  Patent  Airway: intubated       Staff: Anesthesiologist         No notable events documented.     BP      Temp      Pulse     Resp      SpO2

## 2023-09-15 NOTE — RESPIRATORY THERAPY NOTE
RT Protocol Note  Kaci Cruz 60 y.o. male MRN: [id number]  Unit/Bed#: MICU 04 Encounter: [id number]    Assessment    Active Problems: There are no active Hospital Problems. Home Pulmonary Medications:  N/A       No past medical history on file. Social History     Socioeconomic History    Marital status: Single     Spouse name: Not on file    Number of children: Not on file    Years of education: Not on file    Highest education level: Not on file   Occupational History    Not on file   Tobacco Use    Smoking status: Never    Smokeless tobacco: Never   Substance and Sexual Activity    Alcohol use: Yes     Comment: 8-10 can of beers a day    Drug use: Not Currently    Sexual activity: Not on file   Other Topics Concern    Not on file   Social History Narrative    Not on file     Social Determinants of Health     Financial Resource Strain: Not on file   Food Insecurity: Not on file   Transportation Needs: Not on file   Physical Activity: Not on file   Stress: Not on file   Social Connections: Not on file   Intimate Partner Violence: Not on file   Housing Stability: Not on file       Subjective         Objective    Physical Exam:   Assessment Type: Assess only  General Appearance: Sedated  Respiratory Pattern: Normal  Chest Assessment: Chest expansion symmetrical  Bilateral Breath Sounds: Clear  Cough: Unable to assess  Suction: ET Tube  O2 Device: vent    Vitals:  Height 5' 10" (1.778 m), SpO2 98 %. Imaging and other studies: I have personally reviewed pertinent reports. O2 Device: vent     Plan    Respiratory Plan: (P) Vent/NIV/HFNC        Resp Comments: Pt recieved from OR, Pt has a size 8. 0ETT 24@ the lip. Commercial tube turk placed as well as a cuff indicator. Pt tolerating vent mode well, no complications noted, equal clear bilateral breath sounds.

## 2023-09-15 NOTE — RESPIRATORY THERAPY NOTE
RT Ventilator Management Note  Josefina Erickson 60 y.o. male MRN: [id number]  Unit/Bed#: MICU 04 Encounter: [id number]      Daily Screen         9/15/2023  0713 9/15/2023  1603          Patient safety screen outcome[de-identified] Failed Failed      Not Ready for Weaning due to[de-identified] Going on Transport intubated --                Physical Exam:   Assessment Type: (P) Assess only  General Appearance: (P) Sedated  Respiratory Pattern: (P) Assisted  Chest Assessment: (P) Chest expansion symmetrical  Bilateral Breath Sounds: (P) Clear, Diminished  Cough: (P) Unable to assess  Suction: (P) ET Tube  O2 Device: (P) vent      Resp Comments: (P) Pt remains on same vent settings, no changes made to vent at this time, Pt will return to OR tomorrow some time.

## 2023-09-15 NOTE — RESPIRATORY THERAPY NOTE
RT Ventilator Management Note  Eduardo Ruiz 60 y.o. male MRN: [id number]  Unit/Bed#: [facility] 04 Encounter: [id number]      Daily Screen    No data found in the last 10 encounters. Physical Exam:   Assessment Type: Assess only  General Appearance: Sedated  Respiratory Pattern: Normal  Chest Assessment: Chest expansion symmetrical  Bilateral Breath Sounds: Clear  Cough: Unable to assess  Suction: ET Tube  O2 Device: (P) vent      Resp Comments: (P) Pt recieved from OR, Pt has a size 8. 0ETT 24@ the lip. Commercial tube turk placed as well as a cuff indicator. Pt tolerating vent mode well, no complications noted, equal clear bilateral breath sounds.

## 2023-09-15 NOTE — PLAN OF CARE
Problem: MOBILITY - ADULT  Goal: Maintain or return to baseline ADL function  Description: INTERVENTIONS:  -  Assess patient's ability to carry out ADLs; assess patient's baseline for ADL function and identify physical deficits which impact ability to perform ADLs (bathing, care of mouth/teeth, toileting, grooming, dressing, etc.)  - Assess/evaluate cause of self-care deficits   - Assess range of motion  - Assess patient's mobility; develop plan if impaired  - Assess patient's need for assistive devices and provide as appropriate  - Encourage maximum independence but intervene and supervise when necessary  - Involve family in performance of ADLs  - Assess for home care needs following discharge   - Consider OT consult to assist with ADL evaluation and planning for discharge  - Provide patient education as appropriate  Outcome: Progressing  Goal: Maintains/Returns to pre admission functional level  Description: INTERVENTIONS:  - Perform BMAT or MOVE assessment daily.   - Set and communicate daily mobility goal to care team and patient/family/caregiver. - Collaborate with rehabilitation services on mobility goals if consulted  - Perform Range of Motion 4 times a day. - Reposition patient every 2 hours.   - Dangle patient 0 times a day  - Stand patient 0 times a day  - Ambulate patient 0 times a day  - Out of bed to chair 0 times a day   - Out of bed for meals 0 times a day  - Out of bed for toileting  - Record patient progress and toleration of activity level   Outcome: Progressing     Problem: PAIN - ADULT  Goal: Verbalizes/displays adequate comfort level or baseline comfort level  Description: Interventions:  - Encourage patient to monitor pain and request assistance  - Assess pain using appropriate pain scale  - Administer analgesics based on type and severity of pain and evaluate response  - Implement non-pharmacological measures as appropriate and evaluate response  - Consider cultural and social influences on pain and pain management  - Notify physician/advanced practitioner if interventions unsuccessful or patient reports new pain  Outcome: Progressing     Problem: INFECTION - ADULT  Goal: Absence or prevention of progression during hospitalization  Description: INTERVENTIONS:  - Assess and monitor for signs and symptoms of infection  - Monitor lab/diagnostic results  - Monitor all insertion sites, i.e. indwelling lines, tubes, and drains  - Monitor endotracheal if appropriate and nasal secretions for changes in amount and color  - Manning appropriate cooling/warming therapies per order  - Administer medications as ordered  - Instruct and encourage patient and family to use good hand hygiene technique  - Identify and instruct in appropriate isolation precautions for identified infection/condition  Outcome: Progressing  Goal: Absence of fever/infection during neutropenic period  Description: INTERVENTIONS:  - Monitor WBC    Outcome: Progressing     Problem: SAFETY ADULT  Goal: Maintain or return to baseline ADL function  Description: INTERVENTIONS:  -  Assess patient's ability to carry out ADLs; assess patient's baseline for ADL function and identify physical deficits which impact ability to perform ADLs (bathing, care of mouth/teeth, toileting, grooming, dressing, etc.)  - Assess/evaluate cause of self-care deficits   - Assess range of motion  - Assess patient's mobility; develop plan if impaired  - Assess patient's need for assistive devices and provide as appropriate  - Encourage maximum independence but intervene and supervise when necessary  - Involve family in performance of ADLs  - Assess for home care needs following discharge   - Consider OT consult to assist with ADL evaluation and planning for discharge  - Provide patient education as appropriate  Outcome: Progressing  Goal: Maintains/Returns to pre admission functional level  Description: INTERVENTIONS:  - Perform BMAT or MOVE assessment daily.   - Set and communicate daily mobility goal to care team and patient/family/caregiver. - Collaborate with rehabilitation services on mobility goals if consulted  - Perform Range of Motion 4 times a day. - Reposition patient every 2 hours.   - Dangle patient 0 times a day  - Stand patient 0 times a day  - Ambulate patient 0 times a day  - Out of bed to chair 0 times a day   - Out of bed for meals 0 times a day  - Out of bed for toileting  - Record patient progress and toleration of activity level   Outcome: Progressing  Goal: Patient will remain free of falls  Description: INTERVENTIONS:  - Educate patient/family on patient safety including physical limitations  - Instruct patient to call for assistance with activity   - Consult OT/PT to assist with strengthening/mobility   - Keep Call bell within reach  - Keep bed low and locked with side rails adjusted as appropriate  - Keep care items and personal belongings within reach  - Initiate and maintain comfort rounds  - Make Fall Risk Sign visible to staff  - Offer Toileting every 2 Hours, in advance of need  - Initiate/Maintain bed alarm  - Obtain necessary fall risk management equipment: bed alarm  - Apply yellow socks and bracelet for high fall risk patients  - Consider moving patient to room near nurses station  Outcome: Progressing     Problem: DISCHARGE PLANNING  Goal: Discharge to home or other facility with appropriate resources  Description: INTERVENTIONS:  - Identify barriers to discharge w/patient and caregiver  - Arrange for needed discharge resources and transportation as appropriate  - Identify discharge learning needs (meds, wound care, etc.)  - Arrange for interpretive services to assist at discharge as needed  - Refer to Case Management Department for coordinating discharge planning if the patient needs post-hospital services based on physician/advanced practitioner order or complex needs related to functional status, cognitive ability, or social support system  Outcome: Progressing     Problem: Knowledge Deficit  Goal: Patient/family/caregiver demonstrates understanding of disease process, treatment plan, medications, and discharge instructions  Description: Complete learning assessment and assess knowledge base.   Interventions:  - Provide teaching at level of understanding  - Provide teaching via preferred learning methods  Outcome: Progressing

## 2023-09-16 PROBLEM — Z90.49 S/P PARTIAL RESECTION OF COLON: Status: ACTIVE | Noted: 2023-01-01

## 2023-09-16 PROBLEM — R78.81 E COLI BACTEREMIA: Status: ACTIVE | Noted: 2023-01-01

## 2023-09-16 PROBLEM — A41.9 SEPTIC SHOCK (HCC): Status: ACTIVE | Noted: 2023-01-01

## 2023-09-16 PROBLEM — N17.9 AKI (ACUTE KIDNEY INJURY) (HCC): Status: ACTIVE | Noted: 2023-01-01

## 2023-09-16 PROBLEM — D69.6 THROMBOCYTOPENIA (HCC): Status: ACTIVE | Noted: 2023-01-01

## 2023-09-16 PROBLEM — E87.20 LACTIC ACIDOSIS: Status: ACTIVE | Noted: 2023-01-01

## 2023-09-16 PROBLEM — K57.32 DIVERTICULITIS LARGE INTESTINE: Status: ACTIVE | Noted: 2023-01-01

## 2023-09-16 PROBLEM — J96.00 ACUTE RESPIRATORY FAILURE (HCC): Status: ACTIVE | Noted: 2023-01-01

## 2023-09-16 PROBLEM — G93.40 ENCEPHALOPATHY ACUTE: Status: ACTIVE | Noted: 2023-01-01

## 2023-09-16 PROBLEM — Z90.49 S/P SMALL BOWEL RESECTION: Status: ACTIVE | Noted: 2023-01-01

## 2023-09-16 PROBLEM — D62 ACUTE BLOOD LOSS ANEMIA: Status: ACTIVE | Noted: 2023-01-01

## 2023-09-16 PROBLEM — E87.29 HIGH ANION GAP METABOLIC ACIDOSIS: Status: ACTIVE | Noted: 2023-01-01

## 2023-09-16 PROBLEM — B96.20 E COLI BACTEREMIA: Status: ACTIVE | Noted: 2023-01-01

## 2023-09-16 PROBLEM — R65.21 SEPTIC SHOCK (HCC): Status: ACTIVE | Noted: 2023-01-01

## 2023-09-16 NOTE — OP NOTE
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]    * No Diagnosis Codes entered *    Procedure(s):  Left - DEBRIDEMENT WOUND (of abdominal wall and flank  Left - APPLICATION VAC DRESSING  Left - ORCHIECTOMY INGUINAL  reexploation for bleeding thrombosis or hematoma    Specimen(s):  ID Type Source Tests Collected by Time Destination   1 : decending colon Tissue Large Intestine, Left/Descending Colon TISSUE EXAM Gary Daley DO 2023 2240        Estimated Blood Loss:   Minimal    Drains:  NG/OG/Enteral Tube Nasogastric 18 Fr Right nare (Active)   Placement Reverification Aspiration 09/15/23 1000   Site Assessment Clean;Dry; Intact 09/15/23 1000   Status Suction-low continuous 09/15/23 1000   Drainage Appearance Brown 09/15/23 0400   Intake (mL) 0 mL 09/15/23 0600   Output (mL) 50 mL 23 1553   Number of days: 2       Urethral Catheter (Active)   Reasons to continue Urinary Catheter  Accurate I&O assessment in critically ill patients (48 hr. 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      Exploratory laparotomy for re-exploration for hematoma and infection    -R groin debridement [id number]   -L flank [id number]   -L costal margin to L chest wall [id number] extending down to bone  -L orchiectomy    total  -120cm squared down to bone  -465 cm squared down to soft tissue    Complications:   Ongoing necrotizing soft tissue infection as above with debridement limitations due to anatomic zones    Procedure and Technique:  Pt was transported to the operating room from the ICU intubated in critical condition for take back for reexploration for infection, thrombosis or hematoma. Patient arrived to the operating room, was again identified via wristband, positioned supine on the operating table, connected to the ventilator induced with general anesthesia. Both arms were extended pressure points were padded, ABThera VAC was taken down abdomen is prepped and draped in regular sterile fashion. Attention was drawn towards the abdomen ABThera Octopus was taken down. Small bowel was run from ligament of Treitz to the terminal ileum. The segment of small bowel which was what was previously resected had 2 intact staple lines. Terminal ileum appeared pale however there was no immediate concern for misti ischemia so decision was made not to resect terminal ileum. Cecum, ascending, transverse colon Were intact, healthy and viable. Sigmoid staple line and rectal staple line were intact. We encountered extensive amount of ongoing necrosis including the left flank, left costal margin extending along the left chest wall, right lower quadrant. Attention was drawn towards the left costal margin, there was significant amount of purulent exudate and dishwater fluid evacuated along the costal margin tracking along the intercostal muscles. This area was sharply debrided with 10 blade scalpel down to the level of the intercostal muscles totaling 15 x 8 x 1 cm extending down to bone. Hemostasis was achieved with multiple figure-of-eight stick ties.   Unfortunately there was ongoing necrosis of intercostal muscles which was unable to be debrided due to concern for violating pleural cavity. Now attention was directed towards the left flank, necrotic peritoneum and retroperitoneum were debrided to healthy bleeding tissue. Left flank necrotic tissue debrided 15 x 6 x 1 cm total using sharp dissection and heavy Monahan scissors. There was heavy venous ooze draining from the retroperitoneum hemostasis was achieved with figure-of-eight stick tie. Now attention was drawn towards the pelvis and there was significant amount of necrosis tracking along the bilateral inguinal ligaments and into the left spermatic cord tracking into the scrotum with a necrotic left testicle. There was obvious gangrene of the left spermatic cord and testicle therefore spermatic cord was clamped with large Marge forcep at the level of the inguinal ligament excised and hemostasis was achieved with 0 Vicryl stick tie. Inguinal left orchiectomy was achieved with blunt dissection as there was significant necrosis extending into the scrotum. Left testicle was handed off the field. Bilateral inguinal ligaments were excised with Metzenbaum scissors. There was ongoing necrosis at the level of bilateral femoral vessels therefore full excision of the inguinal ligaments was limited at this level. The right lower quadrant skin edge was with significant necrosis and total of 15 x 25 x 9 cm of necrotic tissue was debrided down to level of bladder sharply with heavy Monahan and hemostasis was achieved with Bovie electrocautery. At this point decision we felt further debridement was limited due to critical anatomy along the femoral vessels, chest wall, and retroperitoneum. Was with ongoing vasopressor requirements, and due to ongoing infection was not a candidate for bowel anastomosis. Therefore decision was made to close ABThera VAC was replaced in the abdomen.   1 large Octopus, followed by 2 large blue sponges 1 white sponge was placed over bladder, and 1 black sponge placed anteriorly. Closed with multiple large pieces of Ioban. Connected 125 mmHg, there was moderate seal with minimal leak. At the end of the case sponge and instrument counts were performed all correct. Patient remained intubated critically ill transported back to the ICU in critical condition. Dr. Quan Lan was present for the entire procedure. I was present for the entire procedure.     Patient Disposition:  PACU         SIGNATURE: Markel Correa MD  DATE: September 16, 2023  TIME: 7:21 PM

## 2023-09-16 NOTE — QUICK NOTE
Post Op Check: Patient intubated and sedated    SCMV  420ml  6 Peep  70% FiO2    Drips:   Fent 100mcg/hr    Pressors:  Levo 10 mcg/hr  Vasopressin 0.04units/hr    Temp:  [92.1 °F (33.4 °C)-101.1 °F (38.4 °C)] 99.7 °F (37.6 °C)  HR:  [] 90  BP: ()/(48-72) 90/50  Arterial Line BP: (116-180)/(34-60) 118/36      Physical Exam:  General: intubated and sedated, acute ill appearing  CV: normal rate  Lungs: ventilated on SCMV  Abdomen: Abthera in placed onto wall suction  : ecchymotic scrotum  Extremities: b/l upper and lower extremity edema  Skin: cool, dry    Shavon Beavre MD  PGY-1   09/16/23

## 2023-09-16 NOTE — ANESTHESIA POSTPROCEDURE EVALUATION
Post-Op Assessment Note    CV Status:  Unstable (on pressors)       Mental Status:  Unresponsive   Hydration Status:  Unstable   Airway Patency:  Patent  Airway: intubated      Post Op Vitals Reviewed: Yes      Staff: CRNA   Reason for prolonged intubation > 48 hours:  Unstable open abdomen      No notable events documented.     BP   129/45   Temp      Pulse  96   Resp  14   SpO2   94

## 2023-09-16 NOTE — PROCEDURES
Arterial Line Insertion    Date/Time: 9/15/2023 7:45 PM    Performed by: Julio Barraza PA-C  Authorized by: Julio Barraza PA-C    Patient location:  Bedside and ICU  Consent:     Consent obtained: obtained from son by day team.  Universal protocol:     Site/side marked: yes      Immediately prior to procedure a time out was called: yes      Patient identity confirmed:  Arm band, provided demographic data and hospital-assigned identification number  Indications:     Indications: hemodynamic monitoring and frequent labs / infusion    Pre-procedure details:     Skin preparation:  Chlorhexidine    Preparation: Patient was prepped and draped in sterile fashion    Anesthesia (see MAR for exact dosages): Anesthesia method:  Local infiltration    Local anesthetic:  Lidocaine 1% w/o epi  Procedure details:     Location / Tip of Catheter:  Other (comment) (brachial)    Laterality:  Right    Needle gauge:  18 G (10cm arrow arterial catheter)    Placement technique:  Seldinger    Number of attempts:  1    Successful placement: yes      Transducer: waveform confirmed    Post-procedure details:     Post-procedure:  Sutured and sterile dressing applied    Patient tolerance of procedure: Tolerated well, no immediate complications  Comments:      R radial attempt x1 with successful placement confirmed with ultrasound and free fluid flow/draw back of blood. No waveform. That procedure completed and R radial arterial line to be discontinued. Attention turned to R brachial and successful placement first attempt as above.

## 2023-09-16 NOTE — PROGRESS NOTES
Progress Note - General Surgery  : JORDAN Red Surgery Resident on [facility] 60 y.o. male MRN: [id number]  [facility]: [id number]      Assessment:  55yoM p/w perforated sigmoid diverticulitis c/b fistulization to the intra-abdominal wall w large contained collection of fluid and gas c/f NSTI      Plan:  Intubated  OR today for exploration  Abthera  ICU care        Subjective: N/A      Objective:     Physical Exam:  GEN: NAD   Ab: Soft, NT/ND  Lung: Intubated  CV: RRR   Extrem: No CCE   Neuro: Sedated      I/O       09/14 0701  09/15 0700 09/15 0701 09/16 0700 09/16 0701 09/17 0700    I.V. (mL/kg) 2643.1 (33) 5781.9 (72.3)     Blood 550 2483     NG/GT 0      IV Piggyback 654.6 1409     Total Intake(mL/kg) 3847.7 (48.1) 9673.9 (120.9)     Urine (mL/kg/hr) 425 870 (0.5)     Emesis/NG output 500 250     Drains 950 3400     Total Output 1875 4520     Net +1972.7 +5153.9                  Lab, Imaging and other studies: I have personally reviewed pertinent reports.   , CBC with diff:   Lab Results   Component Value Date    WBC 7.95 09/16/2023    HGB 8.4 (L) 09/16/2023    HCT 23.8 (L) 09/16/2023    MCV 90 09/16/2023    PLT 34 (LL) 09/16/2023    RBC 2.64 (L) 09/16/2023    MCH 31.8 09/16/2023    MCHC 35.3 09/16/2023    RDW 22.1 (H) 09/16/2023    MPV 11.7 09/16/2023   , BMP/CMP:   Lab Results   Component Value Date    SODIUM 136 09/16/2023    K 4.0 09/16/2023     09/16/2023    CO2 20 (L) 09/16/2023    CO2 21 09/15/2023    BUN 56 (H) 09/16/2023    CREATININE 2.27 (H) 09/16/2023    GLUCOSE 89 09/15/2023    CALCIUM 7.5 (L) 09/16/2023     (H) 09/16/2023    ALT 47 09/16/2023    ALKPHOS 82 09/16/2023    EGFR 30 09/16/2023         Christi Flowers MD  9/16/2023 7:01 AM

## 2023-09-16 NOTE — ANESTHESIA PREPROCEDURE EVALUATION
Procedure:  DEBRIDEMENT WOUND [facility] OUT) (Left: Abdomen)  APPLICATION VAC DRESSING (Left: Abdomen)    Relevant Problems   /RENAL   (+) SHANIQUA (acute kidney injury) ([address])      HEMATOLOGY   (+) Acute blood loss anemia   (+) Thrombocytopenia (HCC)      PULMONARY   (+) Acute respiratory failure (HCC)      Hemoglobin    8.4 Low            CRT    2.45 High               Plt    34 Low Panic             Anesthesia Plan  ASA Score- 4     Anesthesia Type- general with ASA Monitors. Additional Monitors:   Airway Plan: ETT. Plan Factors-    Chart reviewed. Induction- intravenous. Postoperative Plan-     Informed Consent- Anesthetic plan and risks discussed with patient. I personally reviewed this patient with the CRNA. Discussed and agreed on the Anesthesia Plan with the CRNA. [place]

## 2023-09-16 NOTE — PROGRESS NOTES
[address] [place] [address]  Progress Note: Critical Care  Name: Usha Currie 60 y.o. male I MRN: [id number]  Unit/Bed#: MICU 04 I Date of Admission: 9/14/2023   Date of Service: 9/16/2023 I Hospital Day: 2    Assessment/Plan   Neuro: Acute postoperative pain  Plan: Frequent neuro checks. CAM-ICU. Delirium precautions. Regulate sleep/wake cycle. Sedation: continue precedex for goal RASS 0 to -1. Analgesia: Fentanyl infusion. Fentanyl 50mcg q1h prn for breakthrough pain or agitation    CV: Septic shock 2/2 perforated diverticulitis with NSTI of abdominal wall  Plan: Continuous cardiopulmonary monitoring. Maintain MAP >65. Monitor resuscitative endpoints. • Current hemodynamic support - norepinephrine 4mcg/min and vasopressin 0.04u/min  o Attempt to hold vasopressin this AM if continues to wean from pressors  • Continue stress dose hydrocortisone 50mg q6h  • q6h endpoints - BMP, ABG, lactic acid    Pulm: Acute hypoxic respiratory failure  Plan: Continuous pulse oximetry. Pulmonary toilet. Elevate HOB. Maintain O2 sat >90%. Vent: AC VC 24/420/50/6. Achieved: same RR and TV. VAP bundle. Oral care. ET suctioning. Elevate HOB. Daily SAT/SBT. Wean FiO2 and PEEP as tolerated. GI: Perforated diverticulitis of sigmoid colon / NSTI of abdominal wall / Open abdomen   Plan: Bowel regimen: hold while NPO. Monitor stool output and quality. GI prophylaxis: protonix  • Monitor ABThera output and quality  • To return to OR today 9/16   • Appreciate red surgery     : SHANIQUA / Metabolic (lactic) acidosis  Plan: Strict I/O. Monitor urine output and renal indices. Avoid nephrotoxins. • Maintain canales for accurate I/O  • No current indication for renal replacement therapy     F/E/N: Hypocalcemia  Plan:   • F: isolyte 125cc/h. Fluid resuscitation prn.   • E: Monitor and replete electrolytes for ionized calcium >1.12, Mg >2, Phos >3, K >4.  • N: hold off on tube feeds given pressor requirement    Endo: Insulin: not indicated. Plan to add SSI if hyperglycemic  Steroids: continue hydrocortisone 50mg q6h  Plan: Monitor blood glucose for goal 140-180. Heme: ABLA / thrombocytopenia   Plan: Monitor Hgb and transfuse for Hgb <7 or based on hemodynamics if actively bleeding. Monitor platelets. VTE prophylaxis: consider starting today. Sequential compression devices and/or foot pumps. ID: Septic shock 2/2 perforated diverticulitis / NSTI of abdominal wall  Antimicrobials: continue cefepime, linezolid, flagyl  Plan: Monitor fever curve and WBC. Maintain normothermia. Daily CHG bath, Peridex oral rinse, and nasal antiseptic while in ICU. · Consider cooling blanket vs IV tylenol if refractory fever    MSK/Skin: Open abdomen  Plan: Frequent turning and repositioning. Pressure injury prevention. Monitor for skin breakdown. PT/OT when appropriate. Encourage OOBTC and ambulation when appropriate. Local wound care prn. • Monitor ABThera output  • Contact red surgery with change in vac output or character  • Monitor surgical wounds for swelling, erythema, exudate  • Surgical wound of this size will require extensive wound care for years to come     Invasive medical instruments:  Invasive Devices     Central Venous Catheter Line  Duration           CVC Central Lines 09/14/23 Triple 16cm 1 day          Peripheral Intravenous Line  Duration           Peripheral IV 09/14/23 Left Hand 1 day    Peripheral IV 09/14/23 Right Antecubital 1 day    Peripheral IV 09/14/23 Right Forearm 1 day    Peripheral IV 09/15/23 Left Forearm <1 day          Arterial Line  Duration           Arterial Line 09/15/23 Other (Comment) <1 day          Drain  Duration           NG/OG/Enteral Tube Nasogastric 18 Fr Right nare 1 day    Urethral Catheter 1 day                 ICU LOS: 17h    CODE STATUS: Level 1    FAMILY UPDATE: I have updated and/or plan to update family today by telephone or in person if applicable.     HOME MEDICATIONS: I have reviewed home medications and reordered as clinically appropriate. No medications prior to admission. IMAGING: I have reviewed pertinent films and reports. LABS: I have reviewed pertinent lab results. MICRO: I have reviewed microbiology data and adjusted antibiotic regimen as clinically appropriate. MULTIDISCIPLINARY CARE: I have performed bedside rounds with nursing colleagues and will discuss the plan above with attending and full CC team.    Tino Canchola PA-C  09/16/23    Disposition: Critical care    Prognosis is poor. Patient has a very high risk of further decompensation and death during this admission. ICU Core Measures     A: Assess, Prevent, and Manage Pain · Has pain been assessed? Yes  · Need for changes to pain regimen? No   B: Both SAT/SAT  · N/A   C: Choice of Sedation · RASS Goal: -1 Drowsy or 0 Alert and Calm  · Need for changes to sedation or analgesia regimen? No   D: Delirium · CAM-ICU: Negative   E: Early Mobility  · Plan for early mobility? No   F: Family Engagement · Plan for family engagement today? Yes       Antibiotic Review: Continue broad spectrum secondary to severity of illness. and Post op requirements     Review of Invasive Devices: Bandar Plan: Continue for accurate I/O monitoring for 48 hours  Central access plan: Medications requiring central line Hemodynamic monitoring  Cathy Plan: Keep arterial line for hemodynamic monitoring, frequent ABGs and frequent labs    Prophylaxis:  VTE Contraindicated secondary to: Plt <50   Stress Ulcer  covered bypantoprazole (PROTONIX) injection 40 mg [[id number]]          Subjective   HPI/24hr events: Went to OR for second look and extensive abdominal wall debridement. Pressors weaned down overnight. Lactic acid downtrending. Acidosis improving. Persistently febrile.     Review of Systems   Unable to perform ROS: Intubated          Objective                            Vitals I/O      Most Recent Min/Max in 24hrs   Temp (!) 101.1 °F (38.4 °C) Temp  Min: 98.2 °F (36.8 °C)  Max: 101.1 °F (38.4 °C)   Pulse 104 Pulse  Min: 98  Max: 112   Resp (!) 25 Resp  Min: 0  Max: 26   /67 BP  Min: 111/68  Max: 153/74   O2 Sat 98 % SpO2  Min: 90 %  Max: 100 %      Intake/Output Summary (Last 24 hours) at 9/16/2023 0314  Last data filed at 9/16/2023 0153  Gross per 24 hour   Intake 9692.35 ml   Output 4195 ml   Net 5497.35 ml         Diet NPO     Invasive Monitoring Physical exam   Arterial Line  Cathy /38  Arterial Line BP  Min: 78/56  Max: 180/60   MAP 66 mmHg  Arterial Line MAP (mmHg)  Min: 62 mmHg  Max: 104 mmHg    Physical Exam  Eyes:      Pupils: Pupils are equal, round, and reactive to light. Skin:     General: Skin is warm and dry. Capillary Refill: Capillary refill takes less than 2 seconds. HENT:      Head: Normocephalic. Neck:     Vascular: Central line present. No JVD. Cardiovascular:      Rate and Rhythm: Normal rate and regular rhythm. Pulses: Decreased pulses. Heart sounds: Normal heart sounds. Musculoskeletal:      Right lower leg: Edema present. Left lower leg: Edema present. Abdominal:      General: There is no distension. Palpations: Abdomen is soft. Comments: ABThera in place with SS output in chamber. Abdominal wall wound with boundaries denoted above. Pictures in media tab. Constitutional:       General: He is not in acute distress. Appearance: He is ill-appearing. He is not toxic-appearing or diaphoretic. Interventions: He is sedated, intubated and restrained. Pulmonary:      Effort: Pulmonary effort is normal. No tachypnea, accessory muscle usage, respiratory distress or accessory muscle usage. He is intubated. Breath sounds: Normal breath sounds and air entry. Neurological:      GCS: GCS eye subscore is 2. GCS verbal subscore is 1. GCS motor subscore is 4. Genitourinary/Anorectal:  FoleyVitals reviewed. Diagnostic Studies      EKG: reviewed  Imaging:  I have personally reviewed pertinent reports. and I have personally reviewed pertinent films in PACS     Medications:  Scheduled PRN   cefepime, 2,000 mg, Q12H  chlorhexidine, 15 mL, Q12H RAMON  hydrocortisone sodium succinate, 50 mg, Q6H [facility]  linezolid, 600 mg, Q12H  metroNIDAZOLE, 500 mg, Q8H  norepinephrine, ,   pantoprazole, 40 mg, Q12H RAMON      fentanyl citrate (PF), 50 mcg, Q1H PRN  norepinephrine, ,        Continuous    dexmedetomidine, 0.1-0.7 mcg/kg/hr, Last Rate: Stopped (09/15/23 1015)  epinephrine, 1-10 mcg/min, Last Rate: Stopped (09/15/23 1030)  fentaNYL, 75 mcg/hr, Last Rate: 75 mcg/hr (09/16/23 0224)  multi-electrolyte, 125 mL/hr, Last Rate: 125 mL/hr (09/16/23 0153)  norepinephrine, 1-30 mcg/min, Last Rate: 3 mcg/min (09/16/23 0248)  vasopressin, 0.04 Units/min, Last Rate: 0.04 Units/min (09/16/23 0153)         Labs:    CBC    Recent Labs     09/14/23  1330 09/14/23  1902 09/15/23  1518 09/15/23  2154   WBC 13.00*   < > 8.24 7.87   HGB 11.4*   < > 10.4* 8.9*   HCT 30.7*   < > 29.7* 24.7*   PLT 52*   < > 39* 48*   BANDSPCT 10*  --   --   --     < > = values in this interval not displayed. BMP    Recent Labs     09/15/23  1520 09/15/23  2154   SODIUM 137 136   K 4.0 3.9    101   CO2 19* 20*   AGAP 18 15   BUN 48* 53*   CREATININE 2.07* 2.09*   CALCIUM 7.4* 7.3*       Coags    Recent Labs     09/14/23  1330 09/15/23  1012   INR 1.41* 1.81*   PTT 35  --         Additional Electrolytes  Recent Labs     09/15/23  0453 09/15/23  0641 09/15/23  1012 09/15/23  1028 09/15/23  1518 09/15/23  2154   MG 2.3  --  2.2  --   --   --    PHOS 5.5*  --  7.3*  --   --   --    CAIONIZED 0.92*   < > 0.97*   < > 0.87* 0.94*    < > = values in this interval not displayed.           Blood Gas    Recent Labs     09/15/23  2154   PHART 7.375   GEY9QUN 34.1*   PO2ART 119.1   MAA4HPX 19.5*   BEART -5.1   SOURCE Line, Arterial     Recent Labs     09/14/23  1330 09/15/23  0031 09/15/23  2154   PHVEN 7.416*  --   --    ORH8ZAO 35.9*  --   --    PO2VEN 39.6  --   --    DLN3SHZ 22.6*  --   --    BEVEN -1.5  --   --    SOURCE  --    < > Line, Arterial    < > = values in this interval not displayed. LFTs  Recent Labs     09/15/23  0641 09/15/23  1012   ALT 18 21   AST 57* 75*   ALKPHOS 98 75   ALB 2.0* 2.0*   TBILI 4.24* 4.29*       Infectious  Recent Labs     09/14/23  1330   PROCALCITONI 24.18*     Glucose  Recent Labs     09/15/23  0641 09/15/23  1012 09/15/23  1520 09/15/23  2154   GLUC 49* 96 101 103               Critical Care Time Delivered: Upon my evaluation, this patient had a high probability of imminent or life-threatening deterioration due to septic shock due to NSTI, acute hypoxic respiratory failure, which required my direct attention, intervention, and personal management. I have personally provided 90 minutes of critical care time, exclusive of procedures, teaching, family meetings, and any prior time recorded by providers other than myself.      Kandis Zendejas PA-C

## 2023-09-16 NOTE — RESPIRATORY THERAPY NOTE
RT Ventilator Management Note  Stoney Beat 60 y.o. male MRN: [id number]  [facility] [id number]      Daily Screen         9/15/2023  1609 9/16/2023  0800          Patient safety screen outcome[de-identified] Failed Failed (P)       Not Ready for Weaning due to[de-identified] -- Going on Transport intubated (P)                 Physical Exam:   Assessment Type: Assess only  General Appearance: Sedated  Respiratory Pattern: Assisted  Chest Assessment: Chest expansion symmetrical  Bilateral Breath Sounds: Clear, Diminished  Cough: Unable to assess  Suction: ET Tube  O2 Device: vent      Resp Comments: (P) Pt received on CMV settings. Tolerating well. No changes at this time. RT will cont to monitor. 09/16/23 0800   Respiratory Assessment   Resp Comments Pt received on CMV settings. Tolerating well. No changes at this time. RT will cont to monitor.    Vent Information   Vent ID [id number]   Vent type Peña G5   Peña C3/G5 Vent Mode (S)CMV   $ Pulse Oximetry Spot Check Charge Completed   SpO2 97 %   (S)CMV Settings   Resp Rate (BPM) 25 BPM   VT (mL) 420 mL   FIO2 (%) 50 %   PEEP (cmH2O) 6 cmH2O   I:E Ratio 1:1.4   Flow Trigger (LPM) 3   Humidification Heater   Heater Temperature (Set) 95 °F (35 °C)   (S)CMV Actuals   Resp Rate (BPM) 25 BPM   VT (mL) 423   MV 10.6   MAP (cmH2O) 12 cmH2O   Peak Pressure (cmH2O) 20 cmH2O   I:E Ratio (Obs) 1:1.4   Insp Resistance 9   Heater Temperature (Obs) 95 °F (35 °C)   (S)CMV Alarms   High Peak Pressure (cmH2O) 40   Low Pressure (cmH2O) 4 cm H2O   High Resp Rate (BPM) 40 BPM   Low Resp Rate (BPM) 8 BPM   High MV (L/min) 20 L/min   Low MV (L/min) 4 L/min   High VT (mL) 1000 mL   Low VT (mL) 250 mL   Apnea Time (s) 20 S   Maintenance   Alarm (pink) cable attached No   Resuscitation bag with peep valve at bedside Yes   Water bag changed Yes   Circuit changed No   Daily Screen   Patient safety screen outcome: Failed   Not Ready for Weaning due to: Going on Transport intubated   IHI Ventilator Associated Pneumonia Bundle   Daily Assessment of Readiness to Extubate Yes   Head of Bed Elevated HOB 30   [REMOVED] ETT  Cuffed;Oral 8 mm   Removal Date/Time: 09/15/23 0947  Placement Date/Time: 09/14/23 1809   Mask Ventilation: Mask ventilation not attempted (0)  Preoxygenated: Yes  Technique: Video laryngoscopy;Rapid sequence  Type: Cuffed;Oral  Tube Size: 8 mm  Laryngoscope: Mac  Blade. ..    Secured at (cm) 24   Measured from [facility] Right   Repositioned Center to Right   Secured by Commercial tube turk   Site Condition Dry   Cuff Pressure (cm H2O)   (green)   HI-LO Suction    (n/a)

## 2023-09-16 NOTE — QUICK NOTE
Surgery  Quick note    Called and updated patient's son Norm Handsome regarding care plan and improvement in endpoints of resuscitation. Explained that we will take patient back to operating room and evaluate bowel viability and begin restoring GI continuity with possible ostomy creation today. He is agreeable to plan.      Akanksha Padilla MD  9/16/23  3:16 PM independent

## 2023-09-17 NOTE — DISCHARGE SUMMARY
Discharge Summary - Jayden Hays 60 y.o. male MRN: [id number]    Unit/Bed#: MICU 04 Encounter: [id number] PCP: No primary care provider on file. Admission Date:   Admission Orders (From admission, onward)     Ordered        09/14/23 2351  Inpatient Admission  Once                        Admitting Diagnosis: Necrotizing soft tissue infection [M79.89]    HPI: Per Dr. Vladimir Navarrete "60 y.o. who [facility] with left sided abdominal pain. Per chart review, pain associated with nausea, vomiting, and diarrhea. No reported PMHx or PSHx. On presentation he was found to have perforated diverticulitis w/ associated abscess. He was found to be acidotic w/ severe electrolyte abnormalities. Patient was taken emergently to the operating room at [facility] where he was found to have perforated sigmoid diverticulitis and an NSTI involving the abdominal wall secondary to the abscess cavity. He underwent a sigmoidectomy along with an extensive abdominal wall debridement. Patient was then tx to [facility] for further critical care and surgical management. Patient was immediately taken to the OR here at [facility] where additional descending colon was debrided along with more abdominal wall."    Procedures Performed: No orders of the defined types were placed in this encounter. Summary of Hospital Course: The morning of 9/15 had rapid escalation of vasopressors and significant worsening of endpoints prompting emergent takeback 2 OR where a 55 cm necrotic small bowel resected as well as remainder of omentum, entire left abdominal wall and majority of left flank tissue secondary to full-thickness necrosis. Necrosis extended down to bladder, spermatic cord and femoral sheath and all overall tissue was debrided, ABThera VAC placed. During this operative trip received 4U PRBC, 2 unit FFP, 1 platelets. Was further resuscitated in the ICU with mild improvement in vasopressors and endpoints.   Despite ongoing resuscitation and improvement in metabolic acidosis developed oliguric SHANIQUA. Was taken back to the OR  for reexploration and was found to have extensive ongoing necrotizing infection including left chest wall down to the intercostal muscles, right lower quadrant including right flank, left spermatic cord and left testicle s/p left orchiectomy, bilateral inguinal ligaments and left flank including left retroperitoneum. Right groin, left flank and left costal margin were debrided. Further debridement was limited due to critical anatomy along the femoral vessels, chest wall and retroperitoneum and he was not a candidate for bowel anastomosis with ongoing vasopressor requirements and infection. ABThera was replaced. Family was updated on inability to gain source control of this infection and decision was made to transition to comfort care. Patient was transitioned and passed peacefully with family at bedside.     Significant Findings, Care, Treatment and Services Provided:    ex-lap LEONARD, sigmoidectomy, abd wall debridement   re-exploration, colectomy, abd wall debridement  9/15 wide excision, SB resection, left in discontinuity; 4 units pRBC, 2u FFP, 2 plts    further debridement to include left orchiectomy     Complications: Oliguric SHANIQUA     Disposition:      Final Diagnosis: Perforated diverticulitis complicated by intraperitoneal abscess and necrotizing soft tissue infection of abdominal wall     Medical Problems     Resolved Problems  Date Reviewed: 2023   None         Condition at Time of Death:      Date, Time and Cause of Death    Date of Death: 23  Time of Death:  2:08 PM  Preliminary Cause of Death: Necrotizing soft tissue infection  Entered by: Jd Olson PA-C[SM1.1]     Attribution     SM1.1 Chip Rosales PA-C 23 14:15          Death Note:    INPATIENT DEATH NOTE  Jose Blanco 60 y.o. male MRN: [id number]  Unit/Bed#: MICU 04 Encounter: [id number]    Date, Time and Cause of Death    Date of Death: 23  Time of Death:  2:08 PM  Preliminary Cause of Death: Necrotizing soft tissue infection  Entered by: Kateryna Willis PA-C[SM1.1]     Attribution     SM1.1 Jerrod Pino PA-C 23 14:15           Patient's Information  Pronounced by: Kateryna Willis PA-C  Did the patient's death occur in the ED?: No  Did the patient's death occur in the OR?: No  Did the patient's death occur less than 10 days post-op?: Yes  Did the patient's death occur within 24 hours of admission?: No  Was code status DNR at the time of death?: Yes    PHYSICAL EXAM:  Unresponsive to noxious stimuli, Spontaneous respirations absent, Breath sounds absent, Carotid pulse absent, Heart sounds absent, Pupillary light reflex absent and Corneal blink reflex absent    Medical Examiner notification criteria:   within 24 hours of surgery / procedure / anesthesia   Medical Examiner's office notified?:  Yes   Medical Examiner accepted case?:  No  Name of Medical Examiner: Chelle Lamb    Family Notification  Was the family notified?: Yes  Date Notified: 23  Time Notified: 4031  Notified by: Kateryna Willis PA-C  Name of Family Notified of Death: Darlen Bowels   Relationship to Patient: Son  Family Notification Route: At bedside  Was the family told to contact a  home?: Yes  Name of [facility][de-identified] Pending    Autopsy Options:  Decision for post-mortem examination not yet made by next of kin.     Primary Service Attending Physician notified?:  yes - Other: Dr. Ghislaine Francisco    Physician/Resident responsible for completing Discharge Summary:  Kateryna Willis PA-C

## 2023-09-17 NOTE — RESPIRATORY THERAPY NOTE
RT Ventilator Management Note  Vilma Mott 60 y.o. male MRN: [id number]  [facility] [id number]      Daily Screen         9/16/2023  0800 9/17/2023  0758          Patient safety screen outcome[de-identified] Failed Failed      Not Ready for Weaning due to[de-identified] Going on Transport intubated Underline problem not resolved                Physical Exam:   Assessment Type: Assess only  General Appearance: Sedated  Respiratory Pattern: Assisted  Chest Assessment: Chest expansion symmetrical  Bilateral Breath Sounds: Diminished, Clear  Cough: None  O2 Device: Ventilator      Resp Comments: Pt received on CMV settings. Tolerating well. No changes at this time. RT will cont to monitor. 09/17/23 0758   Respiratory Assessment   Resp Comments Pt received on CMV settings. Tolerating well. No changes at this time. RT will cont to monitor.    Vent Information   Vent ID [id number]   Vent type [employer] G5   [employer] C3/G5 Vent Mode (S)CMV   $ Pulse Oximetry Spot Check Charge Completed   SpO2 99 %   (S)CMV Settings   Resp Rate (BPM) 24 BPM   VT (mL) 420 mL   FIO2 (%) 50 %   PEEP (cmH2O) 6 cmH2O   I:E Ratio 1:1.4   Insp Time (%) 1 %   Flow Trigger (LPM) 3   Humidification Heater   Heater Temperature (Set) 95 °F (35 °C)   (S)CMV Actuals   Resp Rate (BPM) 24 BPM   VT (mL) 429   MV 10.2   MAP (cmH2O) 13 cmH2O   Peak Pressure (cmH2O) 23 cmH2O   I:E Ratio (Obs) 1:1.5   Insp Resistance 11   Heater Temperature (Obs) 95 °F (35 °C)   Static Compliance (mL/cmH20) 27.5 mL/cmH2O   Plateau Pressure (cm H2O) 20 cm H2O   (S)CMV Alarms   High Peak Pressure (cmH2O) 45   Low Pressure (cmH2O) 5 cm H2O   High Resp Rate (BPM) 40 BPM   Low Resp Rate (BPM) 8 BPM   High MV (L/min) 20 L/min   Low MV (L/min) 4 L/min   High VT (mL) 1100 mL   Low VT (mL) 200 mL   Apnea Time (s) 20 S   Maintenance   Alarm (pink) cable attached No   Resuscitation bag with peep valve at bedside Yes   Water bag changed No   Circuit changed No   Daily Screen   Patient safety screen outcome: Failed   Not Ready for Weaning due to:  Underline problem not resolved   IHI Ventilator Associated Pneumonia Bundle   Daily Assessment of Readiness to Extubate Yes   Head of Bed Elevated HOB 30   ETT    Placement Date/Time: 09/16/23 0300   Location: Oral  Secured at (cm): 23   Secured at (cm) 23   Measured from [address]   Repositioned Left to [address] [facility] by Commercial tube turk   Site Condition Dry   Cuff Pressure (cm H2O)   (green)   HI-LO Suction    (n/a)

## 2023-09-17 NOTE — RESPIRATORY THERAPY NOTE
Extubation   09/17/23 1357   Respiratory Assessment   Resp Comments Pt extubated to comfort measures at this time.    Vent Information   Ventilator End Yes   Daily Screen   Preparing to extubate/ Notify Nurse Yes   Extubation order obtained Yes   Patient extubated Yes

## 2023-09-17 NOTE — OP NOTE
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.89]    Procedure(s):  Left - DEBRIDEMENT WOUND (of abdominal wall and flank  Left - APPLICATION VAC DRESSING  Left - ORCHIECTOMY INGUINAL  reexploation for bleeding thrombosis or hematoma    Specimen(s):  ID Type Source Tests Collected by Time Destination   1 : Necrotic tissue and left testicle Tissue Other TISSUE EXAM Simon Engle DO 2023 1857        Estimated Blood Loss:   Minimal    Drains:  NG/OG/Enteral Tube Nasogastric 18 Fr Right nare (Active)   Placement Reverification Aspiration 09/15/23 1000   Site Assessment Clean;Dry; Intact 09/15/23 1000   Status Suction-low continuous 09/15/23 1000   Drainage Appearance Marcela Cesar; Coffee ground; Thick; Other (Comment) 23 0510   Intake (mL) 30 mL 23 0003   Output (mL) 0 mL 23 0600   Number of days: 3       Urethral Catheter (Active)   Reasons to continue Urinary Catheter  Accurate I&O assessment in critically ill patients (48 hr. 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      Exploratory laparotomy for re-exploration for hematoma and infection     -R groin debridement [id number]   -L flank [id number]   -L costal margin to L chest wall [id number] extending down to bone  -L orchiectomy     total  -120cm squared down to bone  -465 cm squared down to soft tissue    Complications:   Ongoing necrotizing soft tissue infection as above with debridement limitations due to anatomic zones    Procedure and Technique:  Pt was transported to the operating room from the ICU intubated in critical condition for take back for reexploration for infection, thrombosis or hematoma.     Patient arrived to the operating room, was again identified via wristband, positioned supine on the operating table, connected to the ventilator induced with general anesthesia. Both arms were extended pressure points were padded, ABThera VAC was taken down abdomen is prepped and draped in regular sterile fashion.     Attention was drawn towards the abdomen ABThera Octopus was taken down. Small bowel was run from ligament of Treitz to the terminal ileum. The segment of small bowel which was what was previously resected had 2 intact staple lines. Terminal ileum appeared pale however there was no immediate concern for misti ischemia so decision was made not to resect terminal ileum. Cecum, ascending, transverse colon Were intact, healthy and viable. Sigmoid staple line and rectal staple line were intact. We encountered extensive amount of ongoing necrosis including the left flank, left costal margin extending along the left chest wall, right lower quadrant. Attention was drawn towards the left costal margin, there was significant amount of purulent exudate and dishwater fluid evacuated along the costal margin tracking along the intercostal muscles. This area was sharply debrided with 10 blade scalpel down to the level of the intercostal muscles totaling 15 x 8 x 1 cm extending down to bone.   Hemostasis was achieved with multiple figure-of-eight stick ties. Unfortunately there was ongoing necrosis of intercostal muscles which was unable to be debrided due to concern for violating pleural cavity. Now attention was directed towards the left flank, necrotic peritoneum and retroperitoneum were debrided to healthy bleeding tissue. Left flank necrotic tissue debrided 15 x 6 x 1 cm total using sharp dissection and heavy Monahan scissors. There was heavy venous ooze draining from the retroperitoneum hemostasis was achieved with figure-of-eight stick tie. Now attention was drawn towards the pelvis and there was significant amount of necrosis tracking along the bilateral inguinal ligaments and into the left spermatic cord tracking into the scrotum with a necrotic left testicle. There was obvious gangrene of the left spermatic cord and testicle therefore spermatic cord was clamped with large Marge forcep at the level of the inguinal ligament excised and hemostasis was achieved with 0 Vicryl stick tie. Inguinal left orchiectomy was achieved with blunt dissection as there was significant necrosis extending into the scrotum. Left testicle was handed off the field. Bilateral inguinal ligaments were excised with Metzenbaum scissors. There was ongoing necrosis at the level of bilateral femoral vessels therefore full excision of the inguinal ligaments was limited at this level. The right lower quadrant skin edge was with significant necrosis and total of 15 x 25 x 9 cm of necrotic tissue was debrided down to level of bladder sharply with heavy Monahan and hemostasis was achieved with Bovie electrocautery. At this point decision we felt further debridement was limited due to critical anatomy along the femoral vessels, chest wall, and retroperitoneum. Was with ongoing vasopressor requirements, and due to ongoing infection was not a candidate for bowel anastomosis.   Therefore decision was made to close ABThera VAC was replaced in the abdomen. 1 large Octopus, followed by 2 large blue sponges 1 white sponge was placed over bladder, and 1 black sponge placed anteriorly. Closed with multiple large pieces of Ioban. Connected 125 mmHg, there was moderate seal with minimal leak. At the end of the case sponge and instrument counts were performed all correct. Patient remained intubated critically ill transported back to the ICU in critical condition. Dr. Quan Lan was present for the entire procedure.      I was present for the entire procedure. I was present for the entire procedure.     Patient Disposition:  Critical Care Unit        SIGNATURE: Markel Correa MD  DATE: September 17, 2023  TIME: 9:12 AM

## 2023-09-17 NOTE — PROGRESS NOTES
Progress Note - Michelle Boston 60 y.o. male MRN: [id number]    Unit/Bed#: MICU 04 Encounter: [id number]      Assessment:  Michelle Boston is a 60 y.o. male w/perforated sigmoid diverticulitis c/b fistulization to intra-abd wall w/large contained fluid / gas c/f NSTI s/p OR 9/16 for exlap w/extensive necrotizing infection requiring R groin debridement, L flank, L costal margin to L chest wall and L orchiectomy     SCMV 25/420/606  GTT: fent 100, vaso 0.04, levo 7    Plan:  F/u goals of care  Appreciate ICU level care  Abthera vac  1:1 replacements    Subjective:   Patient seen and examined bedside. Intubated and sedated      Objective:     Vitals: Blood pressure (!) 83/52, pulse 80, temperature 98.6 °F (37 °C), resp. rate (!) 25, height 5' 10" (1.778 m), weight 80 kg (176 lb 5.9 oz), SpO2 100 %. ,Body mass index is 25.31 kg/m². Intake/Output Summary (Last 24 hours) at 9/17/2023 0758  Last data filed at 9/17/2023 0600  Gross per 24 hour   Intake 9983.4 ml   Output 5845 ml   Net 4138.4 ml       Physical Exam:   General - frail, intubated  CV - warm, regular rate  Pulm - no respiratory distress on vent  Abd - abthera vac to wall suction  Neuro - sedated  Ext - canales       Invasive Devices     Central Venous Catheter Line  Duration           CVC Central Lines 09/14/23 Triple 16cm 2 days          Peripheral Intravenous Line  Duration           Peripheral IV 09/14/23 Left Hand 2 days    Peripheral IV 09/14/23 Right Antecubital 2 days    Peripheral IV 09/14/23 Right Forearm 2 days    Peripheral IV 09/15/23 Left Forearm 1 day          Arterial Line  Duration           Arterial Line 09/15/23 Other (Comment) 1 day          Drain  Duration           NG/OG/Enteral Tube Nasogastric 18 Fr Right nare 2 days    Urethral Catheter 2 days          Airway  Duration           ETT  1 day                Lab, Imaging and other studies: I have personally reviewed pertinent reports.     VTE Pharmacologic Prophylaxis: Sequential compression device (Venodyne)   VTE Mechanical Prophylaxis: sequential compression device

## 2023-09-17 NOTE — PROGRESS NOTES
[id number]  Progress Note: Critical Care  Name: Jacinda Leal 60 y.o. male I MRN: [id number]  Unit/Bed#: MICU 04 I Date of Admission: 9/14/2023   Date of Service: 9/17/2023 I Hospital Day: 3    Assessment/Plan   Neuro:   · Diagnosis: Encephalopathy   · Likely toxic metabolic secondary to critical illness   · Fentanyl 100 mcg/hr   · Fentanyl 50 mcg PRN breakthrough pain/agitation   · Serial sedation breaks/neuro checks   · Delirium precautions      CV:   · Diagnosis: Septic shock 2/2 perforated diverticulitis c/b NSTI abdominal wall   · Levophed and Vasopressin for MAP goal >65  · Stress dose steroids hydrocortisone 50 q6h   · q6h end points   · Fluid resuscitation as appropriate       Pulm:  · Diagnosis: Acute hypoxic respiratory failure   · AC/VC 25/420/60/6  · Serial ABG and adjust ventilator as appropriate   · Pulmonary toileting, ETT suctioning   · Not appropriate for SBT given hemodynamic instability and open abdomen       GI:   · Diagnosis: Perforated diverticulitis of sigmoid colon with abscess complicated by NSTI of abdominal wall now with open abdomen   · S/p multiple radial debridements and resection of abdominal wall, groin, SBR, sigmoid colon resection, L orchiectomy   · Replacing vac output 1:1 in crystalloid   · NGT in place with dark output   · PPI BID   · General surgery following   · Ongoing GOC discussion with family      :   · Diagnosis: Oliguric SHANIQUA, HAGMA   · UOP not improved with IVF resuscitation   · BUN/Cr rising with persistent acidosis   · Portillo in place for strict I/O   · Poor dialysis candidate given overall clinical picture and morbidity/mortality       F/E/N:   Diagnosis: Hypocalcemia - continue aggressive repletion   · F: Isolyte 125 mL/hr with additional Isolyte bolus for 1:1 vac output replacement   · E: Replete electrolytes for goal K >4, Phos >3, Mg >2  · N: Strict NPO, NGT in place, discuss TPN       Heme/Onc:   · Diagnosis: Acute blood loss anemia, thrombocytopenia   · Total blood products: 7 PRBC, 2 FFP, 4 plts   · 9/16 1 PRBC, 1 Plts   · Continue to trend CBC   · Holding chemical VTE ppx with thrombocytopenia   · Check TEG PRN       Endo:   No active issues    ID:   · Diagnosis: Perforated diverticulitis complicated by abscess and NSTI abdominal wall, Polymicrobial bacteremia   · 9/14 BCx - E coli, Morganella morganii, GPR  · 9/14 Tissue culture - GNR   · 9/15 BCx - Negative x 24 hrs   · Continue Cefepime/Flagyl, Linazolid - day 4   · Trend fever curve and WBC       MSK/Skin:   · Diagnosis: NSTI abdominal wall requiring radical abdominal wall resection   · Wound Vac in place - management per red surgery   · Frequent turning and repositioning       Disposition: Critical care    ICU Core Measures     Vented Patient  VAP Bundle  VAP bundle ordered     A: Assess, Prevent, and Manage Pain · Has pain been assessed? Yes  · Need for changes to pain regimen? No   B: Both Spontaneous Awakening Trials (SATs) and Spontaneous Breathing Trials (SBTs) · Plan to perform spontaneous awakening trial today? Yes   · Plan to perform spontaneous breathing trial today? No secondary to open chest/abdomen  · Obvious barriers to extubation? Yes   C: Choice of Sedation · RASS Goal: -2 Light Sedation  · Need for changes to sedation or analgesia regimen? No   D: Delirium · CAM-ICU: Unable to perform secondary to Acute cognitive dysfunction   E: Early Mobility  · Plan for early mobility? No   F: Family Engagement · Plan for family engagement today? Yes       Antibiotic Review: Awaiting culture results. and Continue broad spectrum secondary to severity of illness. Review of Invasive Devices:     Portillo Plan: Continue for accurate I/O monitoring for 48 hours  Central access plan: Medications requiring central line Hemodynamic monitoring  [place] Plan: Keep arterial line for hemodynamic monitoring, frequent ABGs and frequent labs    Prophylaxis:  VTE Contraindicated secondary to: Plt <50   Stress Ulcer  covered bypantoprazole (PROTONIX) injection 40 mg [[id number]]          Subjective   HPI/24hr events: Taken to OR, underwent further debridement of R groin, L flank, L orchiectomy, extensive ongoing necrotizing infection including L chest down to intercostal muscules, RLQ including R flank. Increased vasopressor requirements from yesterday, currently on Levophed 7, Vaso 0.04. Oliguric SHANIQUA despite volume resuscitation. Pending further discussions with family today. Review of Systems   Unable to perform ROS: Intubated            Objective                            Vitals I/O      Most Recent Min/Max in 24hrs   Temp 98.6 °F (37 °C) Temp  Min: 92.1 °F (33.4 °C)  Max: 100 °F (37.8 °C)   Pulse 80 Pulse  Min: 80  Max: 108   Resp (!) 25 Resp  Min: 25  Max: 25   BP (!) 83/52 BP  Min: 83/52  Max: 112/58   O2 Sat 100 % SpO2  Min: 74 %  Max: 100 %      Intake/Output Summary (Last 24 hours) at 9/17/2023 0756  Last data filed at 9/17/2023 0600  Gross per 24 hour   Intake 9983.4 ml   Output 5845 ml   Net 4138.4 ml         Diet NPO     Invasive Monitoring Physical exam   Arterial Line  [place] /52  Arterial Line BP  Min: 110/38  Max: 154/46   MAP 72 mmHg  Arterial Line MAP (mmHg)  Min: 52 mmHg  Max: 84 mmHg    Physical Exam  Eyes:      Pupils: Pupils are equal, round, and reactive to light. Skin:     General: Skin is cool and dry. Coloration: Skin is mottled. Neck:     Vascular: Central line present. Cardiovascular:      Rate and Rhythm: Normal rate and regular rhythm. Pulses: Decreased pulses. Posterior tibial pulses are detected w/ Doppler on the right side and detected w/ Doppler on the left side. Heart sounds: Normal heart sounds. Abdominal:      Comments: Large vac from R nipple line to L flank and BL groins with high volume SA vac output   Skin changes to L flank and hip    Constitutional:       Appearance: He is ill-appearing and toxic-appearing. Pulmonary:      Effort: Pulmonary effort is normal.      Comments: Mechanical breath sounds   Neurological:      Comments: Sedated on 100 of fentanyl    Genitourinary/Anorectal:     Comments: Scrotum necrotic appearing   Portillo draining tea colored urine with sediment   Vitals reviewed. Diagnostic Studies      Imaging:  I have personally reviewed pertinent reports. Medications:  Scheduled PRN   calcium gluconate, 3 g, Once  cefepime, 2,000 mg, Q12H  chlorhexidine, 15 mL, N00Z RAMON  folic acid 1 mg, thiamine (VITAMIN B1) 100 mg in sodium chloride 0.9 % 100 mL IV piggyback, , Daily  hydrocortisone sodium succinate, 50 mg, Q6H RAMON  linezolid, 600 mg, Q12H  metroNIDAZOLE, 500 mg, Q8H  pantoprazole, 40 mg, Q12H RAMON      fentanyl citrate (PF), 50 mcg, Q1H PRN       Continuous    fentaNYL, 100 mcg/hr, Last Rate: 100 mcg/hr (09/17/23 0600)  multi-electrolyte, 125 mL/hr, Last Rate: 125 mL/hr (09/17/23 0707)  norepinephrine, 1-30 mcg/min, Last Rate: 7 mcg/min (09/17/23 0600)  vasopressin, 0.04 Units/min, Last Rate: 0.04 Units/min (09/17/23 0600)         Labs:    CBC    Recent Labs     09/16/23  0531 09/16/23  1655 09/16/23  1707 09/16/23 1917   WBC 7.95  --   --  7.53   HGB 8.4*   < > 7.1* 7.3*   HCT 23.8*   < > 20.7* 21.5*   PLT 34*  --   --  50*    < > = values in this interval not displayed.      BMP    Recent Labs     09/16/23 2301 09/17/23 0457   SODIUM 135 136   K 4.0 4.0    100   CO2 17* 19*   AGAP 18 17   BUN 60* 64*   CREATININE 2.54* 2.65*   CALCIUM 7.1* 7.2*       Coags    Recent Labs     09/15/23  1012   INR 1.81*        Additional Electrolytes  Recent Labs     09/16/23 1917 09/16/23 2301 09/17/23 0457   MG 2.4  --  2.5   PHOS 6.1*  --  5.2*   CAIONIZED 0.85* 0.92* 0.96*          Blood Gas    Recent Labs     09/17/23  0504   PHART 7.352   [id number] 34.1*   PO2ART 126.9   [id number] 18.5*   BEART -6.4   SOURCE Line, Arterial     Recent Labs     09/17/23  0504   SOURCE Line, Arterial LFTs  Recent Labs     09/16/23  0531 09/17/23  0457   ALT 47  47 61*   *  196* 198*   ALKPHOS 82  82 68   ALB 1.7*  1.7* 2.0*   TBILI 6.03*  6.03* 5.60*       Infectious  No recent results  Glucose  Recent Labs     09/16/23  1554 09/16/23  1917 09/16/23  2301 09/17/23  0457   GLUC 110 117 166* 133               Critical Care Time Delivered: Upon my evaluation, this patient had a high probability of imminent or life-threatening deterioration due to septic shock, NSTI abdominal wall, oliguric SHANIQUA , which required my direct attention, intervention, and personal management. I have personally provided 45 minutes of critical care time, exclusive of procedures, teaching, family meetings, and any prior time recorded by providers other than myself.      Frantz Kincaid PA-C

## 2023-09-18 LAB
ABO GROUP BLD BPU: NORMAL
ABO GROUP BLD BPU: NORMAL
BACTERIA SPEC ANAEROBE CULT: ABNORMAL
BPU ID: NORMAL
BPU ID: NORMAL
CROSSMATCH: NORMAL
CROSSMATCH: NORMAL
UNIT DISPENSE STATUS: NORMAL
UNIT DISPENSE STATUS: NORMAL
UNIT PRODUCT CODE: NORMAL
UNIT PRODUCT CODE: NORMAL
UNIT PRODUCT VOLUME: 350 ML
UNIT PRODUCT VOLUME: 350 ML
UNIT RH: NORMAL
UNIT RH: NORMAL

## 2023-09-18 NOTE — CLINICAL RISK MANAGEMENT
Progress Note - Death in Benny Vizcarra 60 y.o. male MRN: [id number]  [facility]: [id number]      Patient  within 24 hours of restraint  with Soft restraint right wrist and Soft restraint left wrist. Death unrelated to use of restraints. This situation was tracked internally. CMS and PA-JAMIE  notification not required.

## 2023-09-20 LAB
BACTERIA BLD CULT: NORMAL
BACTERIA BLD CULT: NORMAL

## (undated) DEVICE — SUT VICRYL 2-0 REEL 54 IN J286G

## (undated) DEVICE — SUT VICRYL 0 CT-1 36 IN J946H

## (undated) DEVICE — GLOVE SRG BIOGEL 7

## (undated) DEVICE — DRESSING MEPILEX AG BORDER 4 X 8 IN

## (undated) DEVICE — SUT VICRYL 3-0 SH 27 IN J416H

## (undated) DEVICE — GLOVE INDICATOR PI UNDERGLOVE SZ 7.5 BLUE

## (undated) DEVICE — PLUMEPEN PRO 10FT

## (undated) DEVICE — BULB SYRINGE,IRRIGATION WITH PROTECTIVE CAP: Brand: DOVER

## (undated) DEVICE — SUT PROLENE 2-0 CT-2 30 IN 8411H

## (undated) DEVICE — COBAN 4 IN STERILE

## (undated) DEVICE — GRADUATE PLASTIC 1000 ML

## (undated) DEVICE — PREMIUM DRY TRAY LF: Brand: MEDLINE INDUSTRIES, INC.

## (undated) DEVICE — SUT PROLENE 2-0 SH 36 IN 8523H

## (undated) DEVICE — NON-REINFORCED SURGICAL GOWN, S/M: Brand: CONVERTORS

## (undated) DEVICE — CULTURE TUBE AEROBIC

## (undated) DEVICE — PROXIMATE LINEAR CUTTER RELOADS (STANDARD)-100MM: Brand: PROXIMATE

## (undated) DEVICE — GLOVE SRG BIOGEL ECLIPSE 8.5

## (undated) DEVICE — PROXIMATE RELOADABLE LINEAR STAPLER: Brand: PROXIMATE

## (undated) DEVICE — PAD GROUNDING ADULT

## (undated) DEVICE — CHLORAPREP HI-LITE 26ML ORANGE

## (undated) DEVICE — 1820 FOAM BLOCK NEEDLE COUNTER: Brand: DEVON

## (undated) DEVICE — SUT SILK 2-0 TIES 144 IN LA55G

## (undated) DEVICE — PROXIMATE RELOADABLE LINEAR CUTTER WITH SAFETY LOCK-OUT, 100MM: Brand: PROXIMATE

## (undated) DEVICE — SPONGE LAP 18 X 18 IN STRL RFD

## (undated) DEVICE — VAC WHITEFOAM DRESSING LARGE: Brand: V.A.C. WHITEFOAM™

## (undated) DEVICE — ABTHERA OPEN ABDOMEN DRESSING WITH SENSATRAC PAD: Brand: ABTHERA™ SENSAT.R.A.C.™

## (undated) DEVICE — DRESSING MEPILEX AG BORDER 4 X 12 IN

## (undated) DEVICE — MEDI-VAC YANKAUER SUCTION HANDLE W/STRAIGHT TIP & CONTROL VENT: Brand: CARDINAL HEALTH

## (undated) DEVICE — BETHLEHEM MAJOR GENERAL PACK: Brand: CARDINAL HEALTH

## (undated) DEVICE — SUT VICRYL 2-0 SH 27 IN UNDYED J417H

## (undated) DEVICE — TRAY FOLEY 16FR URIMETER SURESTEP

## (undated) DEVICE — PROXIMATE RELOADABLE LINEAR CUTTER WITH SAFETY LOCK-OUT, 75MM: Brand: PROXIMATE

## (undated) DEVICE — 3000CC GUARDIAN II: Brand: GUARDIAN

## (undated) DEVICE — TOWEL SURG XR DETECT GREEN STRL RFD

## (undated) DEVICE — CULTURE TUBE ANAEROBIC

## (undated) DEVICE — 3M™ IOBAN™ 2 ANTIMICROBIAL INCISE DRAPE 6650EZ: Brand: IOBAN™ 2

## (undated) DEVICE — ECHELON CONTOUR W/ BLUE RELOAD: Brand: ECHELON

## (undated) DEVICE — GLOVE SRG BIOGEL ECLIPSE 7

## (undated) DEVICE — INTENDED FOR TISSUE SEPARATION, AND OTHER PROCEDURES THAT REQUIRE A SHARP SURGICAL BLADE TO PUNCTURE OR CUT.: Brand: BARD-PARKER SAFETY BLADES SIZE 10, STERILE

## (undated) DEVICE — ENSEAL 20 CM SHAFT, LARGE JAW: Brand: ENSEAL X1

## (undated) DEVICE — DRAPE EQUIPMENT RF WAND

## (undated) DEVICE — INTENDED FOR TISSUE SEPARATION, AND OTHER PROCEDURES THAT REQUIRE A SHARP SURGICAL BLADE TO PUNCTURE OR CUT.: Brand: BARD-PARKER SAFETY BLADES SIZE 15, STERILE

## (undated) DEVICE — TRANSPOSAL ULTRAFLEX DUO/QUAD ULTRA CART MANIFOLD

## (undated) DEVICE — TIBURON SPLIT SHEET: Brand: CONVERTORS

## (undated) DEVICE — PROXIMATE PLUS MD MULTI-DIRECTIONAL RELEASE SKIN STAPLERS CONTAINS 35 STAINLESS STEEL STAPLES APPROXIMATE CLOSED DIMENSIONS: 6.9MM X 3.9MM WIDE: Brand: PROXIMATE

## (undated) DEVICE — ASTOUND STANDARD SURGICAL GOWN, XXL: Brand: CONVERTORS

## (undated) DEVICE — POOLE SUCTION HANDLE: Brand: CARDINAL HEALTH

## (undated) DEVICE — VAC DRESSING SENSATRAC RND

## (undated) DEVICE — SUCTION SCINTILLANT LIGHT

## (undated) DEVICE — MAYO STAND COVER: Brand: CONVERTORS

## (undated) DEVICE — WOUND RETRACTOR AND PROTECTOR: Brand: ALEXIS O WOUND PROTECTOR-RETRACTOR

## (undated) DEVICE — HYDROGEN PEROXIDE 3 PCT 16 OZ

## (undated) DEVICE — GLOVE INDICATOR PI UNDERGLOVE SZ 7 BLUE

## (undated) DEVICE — BETHLEHEM UNIVERSAL OUTPATIENT: Brand: CARDINAL HEALTH

## (undated) DEVICE — TOWEL SET X-RAY

## (undated) DEVICE — ECHELON CONTOUR W/ GREEN RELOAD: Brand: ECHELON

## (undated) DEVICE — MEDI-VAC YANK SUCT HNDL W/TPRD BULBOUS TIP: Brand: CARDINAL HEALTH

## (undated) DEVICE — TUBING SUCTION 5MM X 12 FT

## (undated) DEVICE — LIGHT GLOVE GREEN

## (undated) DEVICE — PROXIMATE LINEAR STAPLER RELOADS: Brand: PROXIMATE

## (undated) DEVICE — VAC CANISTER 500ML

## (undated) DEVICE — SUT SILK 3-0 SH 30 IN K832H

## (undated) DEVICE — GLOVE INDICATOR PI UNDERGLOVE SZ 9 BLUE

## (undated) DEVICE — LIGACLIP MCA MULTIPLE CLIP APPLIERS, 20 MEDIUM CLIPS: Brand: LIGACLIP

## (undated) DEVICE — PROXIMATE LINEAR CUTTER RELOAD, BLUE, 75MM: Brand: PROXIMATE

## (undated) DEVICE — HEMOCLIP CARTRIDGE MED